# Patient Record
Sex: FEMALE | Race: ASIAN | NOT HISPANIC OR LATINO | URBAN - METROPOLITAN AREA
[De-identification: names, ages, dates, MRNs, and addresses within clinical notes are randomized per-mention and may not be internally consistent; named-entity substitution may affect disease eponyms.]

---

## 2022-02-27 ENCOUNTER — EMERGENCY (EMERGENCY)
Facility: HOSPITAL | Age: 27
LOS: 1 days | Discharge: HOME | End: 2022-02-27
Attending: EMERGENCY MEDICINE | Admitting: EMERGENCY MEDICINE
Payer: MEDICAID

## 2022-02-27 VITALS
OXYGEN SATURATION: 100 % | SYSTOLIC BLOOD PRESSURE: 114 MMHG | DIASTOLIC BLOOD PRESSURE: 74 MMHG | HEART RATE: 75 BPM | RESPIRATION RATE: 18 BRPM | TEMPERATURE: 99 F

## 2022-02-27 VITALS
RESPIRATION RATE: 17 BRPM | HEART RATE: 71 BPM | SYSTOLIC BLOOD PRESSURE: 111 MMHG | TEMPERATURE: 98 F | DIASTOLIC BLOOD PRESSURE: 66 MMHG | OXYGEN SATURATION: 100 %

## 2022-02-27 VITALS
RESPIRATION RATE: 16 BRPM | SYSTOLIC BLOOD PRESSURE: 130 MMHG | OXYGEN SATURATION: 100 % | HEART RATE: 92 BPM | TEMPERATURE: 98 F | DIASTOLIC BLOOD PRESSURE: 89 MMHG

## 2022-02-27 LAB
ALBUMIN SERPL ELPH-MCNC: 4.1 G/DL — SIGNIFICANT CHANGE UP (ref 3.3–5)
ALP SERPL-CCNC: 51 U/L — SIGNIFICANT CHANGE UP (ref 40–120)
ALT FLD-CCNC: 19 U/L — SIGNIFICANT CHANGE UP (ref 4–33)
ANION GAP SERPL CALC-SCNC: 13 MMOL/L — SIGNIFICANT CHANGE UP (ref 7–14)
APPEARANCE UR: ABNORMAL
AST SERPL-CCNC: 16 U/L — SIGNIFICANT CHANGE UP (ref 4–32)
BACTERIA # UR AUTO: ABNORMAL
BASE EXCESS BLDV CALC-SCNC: -0.3 MMOL/L — SIGNIFICANT CHANGE UP (ref -2–3)
BASOPHILS # BLD AUTO: 0.02 K/UL — SIGNIFICANT CHANGE UP (ref 0–0.2)
BASOPHILS NFR BLD AUTO: 0.2 % — SIGNIFICANT CHANGE UP (ref 0–2)
BILIRUB SERPL-MCNC: 0.5 MG/DL — SIGNIFICANT CHANGE UP (ref 0.2–1.2)
BILIRUB UR-MCNC: NEGATIVE — SIGNIFICANT CHANGE UP
BLOOD GAS VENOUS COMPREHENSIVE RESULT: SIGNIFICANT CHANGE UP
BUN SERPL-MCNC: 6 MG/DL — LOW (ref 7–23)
CALCIUM SERPL-MCNC: 9.7 MG/DL — SIGNIFICANT CHANGE UP (ref 8.4–10.5)
CHLORIDE BLDV-SCNC: 101 MMOL/L — SIGNIFICANT CHANGE UP (ref 96–108)
CHLORIDE SERPL-SCNC: 102 MMOL/L — SIGNIFICANT CHANGE UP (ref 98–107)
CO2 BLDV-SCNC: 25.3 MMOL/L — SIGNIFICANT CHANGE UP (ref 22–26)
CO2 SERPL-SCNC: 21 MMOL/L — LOW (ref 22–31)
COLOR SPEC: YELLOW — SIGNIFICANT CHANGE UP
CREAT SERPL-MCNC: 0.55 MG/DL — SIGNIFICANT CHANGE UP (ref 0.5–1.3)
DIFF PNL FLD: NEGATIVE — SIGNIFICANT CHANGE UP
EOSINOPHIL # BLD AUTO: 0.01 K/UL — SIGNIFICANT CHANGE UP (ref 0–0.5)
EOSINOPHIL NFR BLD AUTO: 0.1 % — SIGNIFICANT CHANGE UP (ref 0–6)
EPI CELLS # UR: 7 /HPF — HIGH (ref 0–5)
GAS PNL BLDV: 134 MMOL/L — LOW (ref 136–145)
GLUCOSE BLDV-MCNC: 73 MG/DL — SIGNIFICANT CHANGE UP (ref 70–99)
GLUCOSE SERPL-MCNC: 78 MG/DL — SIGNIFICANT CHANGE UP (ref 70–99)
GLUCOSE UR QL: NEGATIVE — SIGNIFICANT CHANGE UP
HCO3 BLDV-SCNC: 24 MMOL/L — SIGNIFICANT CHANGE UP (ref 22–29)
HCT VFR BLD CALC: 42.8 % — SIGNIFICANT CHANGE UP (ref 34.5–45)
HCT VFR BLDA CALC: 44 % — SIGNIFICANT CHANGE UP (ref 34.5–46.5)
HGB BLD CALC-MCNC: 14.6 G/DL — SIGNIFICANT CHANGE UP (ref 11.5–15.5)
HGB BLD-MCNC: 14.4 G/DL — SIGNIFICANT CHANGE UP (ref 11.5–15.5)
HYALINE CASTS # UR AUTO: 0 /LPF — SIGNIFICANT CHANGE UP (ref 0–7)
IANC: 7.04 K/UL — SIGNIFICANT CHANGE UP (ref 1.5–8.5)
IMM GRANULOCYTES NFR BLD AUTO: 0.3 % — SIGNIFICANT CHANGE UP (ref 0–1.5)
KETONES UR-MCNC: ABNORMAL
LACTATE BLDV-MCNC: 1.6 MMOL/L — SIGNIFICANT CHANGE UP (ref 0.5–2)
LEUKOCYTE ESTERASE UR-ACNC: ABNORMAL
LYMPHOCYTES # BLD AUTO: 1.46 K/UL — SIGNIFICANT CHANGE UP (ref 1–3.3)
LYMPHOCYTES # BLD AUTO: 16.2 % — SIGNIFICANT CHANGE UP (ref 13–44)
MCHC RBC-ENTMCNC: 27.3 PG — SIGNIFICANT CHANGE UP (ref 27–34)
MCHC RBC-ENTMCNC: 33.6 GM/DL — SIGNIFICANT CHANGE UP (ref 32–36)
MCV RBC AUTO: 81.2 FL — SIGNIFICANT CHANGE UP (ref 80–100)
MONOCYTES # BLD AUTO: 0.45 K/UL — SIGNIFICANT CHANGE UP (ref 0–0.9)
MONOCYTES NFR BLD AUTO: 5 % — SIGNIFICANT CHANGE UP (ref 2–14)
NEUTROPHILS # BLD AUTO: 7.04 K/UL — SIGNIFICANT CHANGE UP (ref 1.8–7.4)
NEUTROPHILS NFR BLD AUTO: 78.2 % — HIGH (ref 43–77)
NITRITE UR-MCNC: NEGATIVE — SIGNIFICANT CHANGE UP
NRBC # BLD: 0 /100 WBCS — SIGNIFICANT CHANGE UP
NRBC # FLD: 0 K/UL — SIGNIFICANT CHANGE UP
PCO2 BLDV: 38 MMHG — LOW (ref 39–42)
PH BLDV: 7.41 — SIGNIFICANT CHANGE UP (ref 7.32–7.43)
PH UR: 7.5 — SIGNIFICANT CHANGE UP (ref 5–8)
PLATELET # BLD AUTO: 273 K/UL — SIGNIFICANT CHANGE UP (ref 150–400)
PO2 BLDV: 21 MMHG — SIGNIFICANT CHANGE UP
POTASSIUM BLDV-SCNC: 3.6 MMOL/L — SIGNIFICANT CHANGE UP (ref 3.5–5.1)
POTASSIUM SERPL-MCNC: 3.7 MMOL/L — SIGNIFICANT CHANGE UP (ref 3.5–5.3)
POTASSIUM SERPL-SCNC: 3.7 MMOL/L — SIGNIFICANT CHANGE UP (ref 3.5–5.3)
PROT SERPL-MCNC: 7.1 G/DL — SIGNIFICANT CHANGE UP (ref 6–8.3)
PROT UR-MCNC: ABNORMAL
RBC # BLD: 5.27 M/UL — HIGH (ref 3.8–5.2)
RBC # FLD: 13.2 % — SIGNIFICANT CHANGE UP (ref 10.3–14.5)
RBC CASTS # UR COMP ASSIST: 2 /HPF — SIGNIFICANT CHANGE UP (ref 0–4)
SAO2 % BLDV: 38.8 % — SIGNIFICANT CHANGE UP
SODIUM SERPL-SCNC: 136 MMOL/L — SIGNIFICANT CHANGE UP (ref 135–145)
SP GR SPEC: 1.02 — SIGNIFICANT CHANGE UP (ref 1–1.05)
UROBILINOGEN FLD QL: SIGNIFICANT CHANGE UP
WBC # BLD: 9.01 K/UL — SIGNIFICANT CHANGE UP (ref 3.8–10.5)
WBC # FLD AUTO: 9.01 K/UL — SIGNIFICANT CHANGE UP (ref 3.8–10.5)
WBC UR QL: 7 /HPF — HIGH (ref 0–5)

## 2022-02-27 PROCEDURE — 99284 EMERGENCY DEPT VISIT MOD MDM: CPT

## 2022-02-27 RX ORDER — ONDANSETRON 8 MG/1
1 TABLET, FILM COATED ORAL
Qty: 6 | Refills: 0
Start: 2022-02-27 | End: 2022-02-28

## 2022-02-27 RX ORDER — SODIUM CHLORIDE 9 MG/ML
1000 INJECTION INTRAMUSCULAR; INTRAVENOUS; SUBCUTANEOUS ONCE
Refills: 0 | Status: COMPLETED | OUTPATIENT
Start: 2022-02-27 | End: 2022-02-27

## 2022-02-27 RX ORDER — ONDANSETRON 8 MG/1
4 TABLET, FILM COATED ORAL ONCE
Refills: 0 | Status: COMPLETED | OUTPATIENT
Start: 2022-02-27 | End: 2022-02-27

## 2022-02-27 RX ADMIN — SODIUM CHLORIDE 1000 MILLILITER(S): 9 INJECTION INTRAMUSCULAR; INTRAVENOUS; SUBCUTANEOUS at 19:41

## 2022-02-27 RX ADMIN — ONDANSETRON 4 MILLIGRAM(S): 8 TABLET, FILM COATED ORAL at 19:41

## 2022-02-27 RX ADMIN — Medication 30 MILLILITER(S): at 21:43

## 2022-02-27 NOTE — ED PROVIDER NOTE - OBJECTIVE STATEMENT
25 y/o female presenting to ER c/o weakness w/ nausea/vomiting. Pt. ~6 wks pregnant took mifrepistone 200mg  yesterday for elective termination of pregnancy - states this morning developed nausea and vomiting with weakness. Admits to some mild abdominal cramping but otherwise no pain. Pt. denies vaginal bleeding urinary frequency urgnecy fevers or chills.

## 2022-02-27 NOTE — ED ADULT TRIAGE NOTE - CHIEF COMPLAINT QUOTE
pt 6 weeks pregnant c/o n/v and generalized weakness starting this AM. Pt states she was prescribed Mifepristone for pregnancy termination- took first dose yesterday. No PMH

## 2022-02-27 NOTE — ED PROVIDER NOTE - ATTENDING CONTRIBUTION TO CARE
Attending note:   After face to face evaluation of this patient, I concur with above noted hx, pe, and care plan for this patient.  Rodriguez: 26 yof appx 6 weeks IUP taking Mifepristone yesterday at 5pm and today with nausea and vomiting, no abd pain or bleeding or diarrhea. Pt did not take second dose yet. Pt told is unsuccessful would need D&C. Pt is in no distress, clear lungs, RRR, abd soft with no tn, no edema, no CVAT.   Pt with likely side effects from medication with unremarkable exam - provide sx relief and fluids and check electrolytes. if pt able to tolerate, take second dose.

## 2022-02-27 NOTE — ED PROVIDER NOTE - PROGRESS NOTE DETAILS
SESAR Cronin - patient feeling better after meds/iv fluids, labs wnl - po challened without diffculty. Stable for DC>

## 2022-02-27 NOTE — ED ADULT NURSE NOTE - WILL THE PATIENT ACCEPT THE PFIZER COVID-19 VACCINE IF ELIGIBLE AND IT IS AVAILABLE?
ASPIRIN and NSAID PRODUCTS    The following is a list of medications that either contain aspirin or nonsteroidal anti-inflammatory agents (NSAID's). Please do not take these medications.    OVER-THE-COUNTER:  Do not take five (5) days prior to procedure.  Do not take for two (2) weeks after procedure.  · Aspirin (generic):  Brand Names:  Anacin, Phuong, [place], Aspergum, Aspercin, Aspermin, Aspertab, Back-quell, Duradyne, Empirin, Gemnisyn, Genprin, Gensan, Magnaprin, McNess Pain Tab, Momentum, P-A-C, Pain Reliever Tabs, Tri-Pain Caplets, Vanquish Caplet.  · Buffered Aspirin (generic):  Brand Names:  Ascriptin, Bufferin, Ecotrin, Buffaprin, Buffasal, Buffinol, COPE.  · BC Powders/Tablets  · Goody's Powders  · Stanback Powders or Tablets  · Excedrin, Excedrin Extra, Excedrin IB  · Mel [place] or Bromo-[place]  · Ibuprofen (generic):  Brand Names:  Advil, Nuprin, Motrin IB, Adapin, Genpril, Ibufen 200, Menadol, Midol IB, Dristan Sinus, Ursinus [place] Tabs, Dimetapp Sinus, Valparin, Haltran Tabs, Hu's Pills, Ajay.  · Aspirin Suppositories (generic, any strength)  · Naproxen (generic)  Brand Name: Aleve  · Pepto Bismol    PRESCRIPTION:  Brand Name Generic Name    Fiorinal  butalbital, aspirin, caffeine    Naprosyn, Anaprox  naproxen    Voltaren, Cataflam  diclofenac    Feldene  piroxicam    Motrin (Rx), Rufen  ibuprofen    Ansaid  flurbiprofen    Orudis  ketoprofen    Dolobid  diflunisal    Clinoril  sulindac    Indocin  indomethacin    Tolectin  tolmetin    Azdone Tabs  aspirin plus hydrocodone    Percodan  aspirin plus oxycodone    Synalgos  aspirin plus dihydrocodeine    Daypro  oxaprozin    Lodine  etodolac    Meclomen  meclofenamate    Nalfon  fenoprofen    Ponstel (mefenamic acid)     Relafen  nabumetone    Toradol  ketorolac     If you have a headache, backache, arthritis or other painful condition, please take Tylenol, Datril or some other non aspirin-containing product.            Interactions with Herbs  and Dietary Supplements      Ginkgo biloba    Garlic    Saw palmetto    Alfalfa    American ginseng    Gisele    Anise    Arnica mont[place]    Asafetida    [place] bark    Bilberry    Birch    Black cohosh    Bladderwrack    Bogbean    Boldo    Borage seed oil    Bromelain    Capsicum    Cat's claw    Celery     Chamomile    [place]    Clove    Coleus    Cordyceps       Dandelion     Danshen    Devil's claw    Dong quai    EPA (eicosapentaenoic    acid, found in fish oils)    Evening primrose oil    Fenugreek    Feverfew    Fish oil    Flaxseed/flax powder (not a    concern with flaxseed oil)    Tracie    Grapefruit juice    Grapeseed    Green tea    Guggul    Gymnestra    Horse chestnut    Horseradish    Licorie root    Lovage root    Male fern    Ashish    Melatonin    Nordihydroguairetic acid    (NDGA)   Omega-3 fatty acids    Onion    Papain    Panax ginseng    Parsley    Passionflower    Poplar    Prickly huan    Propolis    Quassia    Red clover    Reishi    Siberian ginseng    Sweet clover    Rue    Sweet birch    Turmeric    Vitamin E    White willow    Wild carrot    Wild lettuce    Unity    [place]    Maple           Not applicable

## 2022-02-27 NOTE — ED PROVIDER NOTE - PATIENT PORTAL LINK FT
You can access the FollowMyHealth Patient Portal offered by [facility] by registering at the following website: [email or web address]. By joining [employer]’s FollowMyHealth portal, you will also be able to view your health information using other applications (apps) compatible with our system.

## 2022-02-27 NOTE — ED PROVIDER NOTE - NSFOLLOWUPINSTRUCTIONS_ED_ALL_ED_FT
Acute Nausea and Vomiting  WHAT YOU NEED TO KNOW:  Acute nausea and vomiting start suddenly, worsen quickly, and last a short time.  DISCHARGE INSTRUCTIONS:  Return to the emergency department if:  You see blood in your vomit or your bowel movements.  You have sudden, severe pain in your chest and upper abdomen after hard vomiting or retching.  You have swelling in your neck and chest.  You are dizzy, cold, and thirsty and your eyes and mouth are dry.  You are urinating very little or not at all.  You have muscle weakness, leg cramps, and trouble breathing.  Your heart is beating much faster than normal.  You continue to vomit for more than 48 hours.  Contact your healthcare provider if:  You have frequent dry heaves (vomiting but nothing comes out).  Your nausea and vomiting does not get better or go away after you use medicine.  You have questions or concerns about your condition or treatment.  Medicines: You may need any of the following:  Medicines may be given to calm your stomach and stop your vomiting. You may also need medicines to help you feel more relaxed or to stop nausea and vomiting caused by motion sickness.  Gastrointestinal stimulants are used to help empty your stomach and bowels. This may help decrease nausea and vomiting.  Take your medicine as directed. Contact your healthcare provider if you think your medicine is not helping or if you have side effects. Tell him or her if you are allergic to any medicine. Keep a list of the medicines, vitamins, and herbs you take. Include the amounts, and when and why you take them. Bring the list or the pill bottles to follow-up visits. Carry your medicine list with you in case of an emergency.  Prevent or manage acute nausea and vomiting:  Do not drink alcohol. Alcohol may upset or irritate your stomach. Too much alcohol can also cause acute nausea and vomiting.  Control stress. Headaches due to stress may cause nausea and vomiting. Find ways to relax and manage your stress. Get more rest and sleep.  Drink more liquids as directed. Vomiting can lead to dehydration. It is important to drink more liquids to help replace lost body fluids. Ask your healthcare provider how much liquid to drink each day and which liquids are best for you. Your provider may recommend that you drink an oral rehydration solution (ORS). ORS contains water, salts, and sugar that are needed to replace the lost body fluids. Ask what kind of ORS to use, how much to drink, and where to get it.  Eat smaller meals, more often. Eat small amounts of food every 2 to 3 hours, even if you are not hungry. Food in your stomach may decrease your nausea.  Talk to your healthcare provider before you take over-the-counter (OTC) medicines. These medicines can cause serious problems if you use certain other medicines, or you have a medical condition. You may have problems if you use too much or use them for longer than the label says. Follow directions on the label carefully.  Follow up with your healthcare provider as directed: Write down your questions so you remember to ask them during your follow-up visits.  Náuseas y vómitos agudos    LO QUE NECESITA SABER:  Las náuseas y los vómitos agudos comienzan de forma repentina, empeoran rápidamente y araya un período breve de tiempo.  INSTRUCCIONES SOBRE EL RICCARDO HOSPITALARIA:  Busque atención médica de inmediato si:  Usted nota elle en byrne vómito o en sara evacuaciones.  Usted siente un dolor súbito e intenso en el pecho y la parte superior de byrne abdomen después de tener vómitos yecenia o tratar de vomitar.  Usted tiene el shant y el pecho inflamados.  Usted está mareado, tiene frío, sed y sequedad en los ojos y la boca.  Usted está orinando muy poco o nada en absoluto.  Usted tiene debilidad muscular, calambres en las piernas y dificultad para respirar.  Byrne corazón late más rápido que de costumbre.  Usted continúa vomitando por más de 48 horas.  Comuníquese con byrne médico si:  Usted tiene arcadas secas (vómitos sin que salga nada) frecuentes.  Sara náusea y vómitos no mejoran ni desaparecen después de usar el medicamento.  Usted tiene preguntas o inquietudes acerca de byrne condición o tratamiento.  Medicamentos:Es posible que usted necesite alguno de los siguientes:  Los medicamentosse puede administrar para calmarle el estómago y detener sara vómitos. Usted también puede necesitar medicamentos para ayudarlo a sentirse más relajado o para detener las náuseas y los vómitos causados por el mareo por movimiento.  Se usan los estimulantes gastrointestinalespara ayudar a vaciar byrne estómago y los intestinos. [place] puede ayudar para reducir las náuseas y el vómito.  [place] sara medicamentos everton se le haya indicado.Consulte con byrne médico si usted smiley que byrne medicamento no le está ayudando o si presenta efectos secundarios. Infórmele si es alérgico a cualquier medicamento. Mantenga horacio lista actualizada de los medicamentos, las vitaminas y los productos herbales que jf. Incluya los siguientes datos de los medicamentos: cantidad, frecuencia y motivo de administración. Traiga con usted la lista o los envases de las píldoras a sara citas de seguimiento. Lleve la lista de los medicamentos con usted en marlen de horacio emergencia.  Evite o controle las náuseas y los vómitos agudos:  No consuma alcohol.El alcohol podría causarle malestar o irritación estomacal. Horacio cantidad elevada de alcohol también puede causar náuseas y vómitos agudos.  Controle el estrés.Los rene de farzaneh que son el resultado de tensión nerviosa pueden causar náuseas y vómitos. Busque la manera de relajarse y controlar el estrés. Descanse y duerma más.  [place] más líquidos everton se le indique.Los vómitos pueden llevar a la deshidratación. Es importante beber más líquidos para ayudar a reemplazar los fluidos corporales perdidos. Pregunte a byrne médico sobre la cantidad de líquido que necesita khris todos los ayad y cuáles le recomienda. Byrne médico podría recomendarle que tome horacio solución de rehidratación oral (SRO). Las soluciones de rehidratación oral contienen la cantidad adecuada de agua, sales y azúcar que necesita para restituir los líquidos que perdió byrne organismo. Pregunte qué tipo de solución de rehidratación oral debe usar, qué cantidad debe khris y dónde puede obtenerla.  Ingiera comidas más pequeñas, más a menudo.Coma pequeñas cantidades de comida cada 2 o 3 horas, incluso si no tiene hambre. Sara náuseas podrían disminuir si tiene comida en el estómago.  Hable con byrne médico antes de khris medicamentos de venta sarah.Estos medicamentos pueden causar problemas serios si los usa junto con ciertos medicamentos o si tiene determinadas condiciones médicas. Podrían tener problemas si usa horacio dosis demasiado riccardo o los usa odell más tiempo de lo indicado. Siga las indicaciones de la etiqueta al pie de la letra.  Acuda a sara consultas de control con byrne médico según le indicaron.Anote sara preguntas para que se acuerde de hacerlas odell sara visitas.

## 2022-02-27 NOTE — ED ADULT TRIAGE NOTE - CHIEF COMPLAINT QUOTE
Nausea and vomiting and stomach pain x 2 hours. PT recently D/C'd from ED for same symptoms today. Pt is 6 weeks pregnant. No PMH

## 2022-02-27 NOTE — ED PROVIDER NOTE - CLINICAL SUMMARY MEDICAL DECISION MAKING FREE TEXT BOX
27 y/o female c/o nausea vomit and weakness x 1 day after taking first does of mifrepistone  -likely secondary to medication  -cbc cmp ua  -iv fluids zofran  -reassess

## 2022-02-27 NOTE — ED ADULT NURSE NOTE - OBJECTIVE STATEMENT
Patient is a 27 yo female, denies PMH, presenting with nausea, vomiting, weakness since this morning. Patient ~6 weeks pregnant and took mifrepistone 200 mg yesterday for elective termination of pregnancy. Patient with mild abdominal cramping, denies fever, chills, diarrhea, urinary symptoms. AAOx4, no signs of distress. 20G PIV placed to [facility], labs sent per orders. Medicated for nausea. Patient educated on fall precautions.

## 2022-02-28 VITALS
HEART RATE: 80 BPM | OXYGEN SATURATION: 100 % | SYSTOLIC BLOOD PRESSURE: 96 MMHG | TEMPERATURE: 98 F | RESPIRATION RATE: 16 BRPM | DIASTOLIC BLOOD PRESSURE: 59 MMHG

## 2022-02-28 RX ORDER — KETOROLAC TROMETHAMINE 30 MG/ML
15 SYRINGE (ML) INJECTION ONCE
Refills: 0 | Status: DISCONTINUED | OUTPATIENT
Start: 2022-02-28 | End: 2022-02-28

## 2022-02-28 RX ORDER — MORPHINE SULFATE 50 MG/1
4 CAPSULE, EXTENDED RELEASE ORAL ONCE
Refills: 0 | Status: DISCONTINUED | OUTPATIENT
Start: 2022-02-28 | End: 2022-02-28

## 2022-02-28 RX ORDER — SODIUM CHLORIDE 9 MG/ML
1000 INJECTION, SOLUTION INTRAVENOUS ONCE
Refills: 0 | Status: COMPLETED | OUTPATIENT
Start: 2022-02-28 | End: 2022-02-28

## 2022-02-28 RX ORDER — ACETAMINOPHEN 500 MG
1000 TABLET ORAL ONCE
Refills: 0 | Status: COMPLETED | OUTPATIENT
Start: 2022-02-28 | End: 2022-02-28

## 2022-02-28 RX ORDER — ONDANSETRON 8 MG/1
4 TABLET, FILM COATED ORAL ONCE
Refills: 0 | Status: COMPLETED | OUTPATIENT
Start: 2022-02-28 | End: 2022-02-28

## 2022-02-28 RX ADMIN — Medication 15 MILLIGRAM(S): at 01:24

## 2022-02-28 RX ADMIN — MORPHINE SULFATE 4 MILLIGRAM(S): 50 CAPSULE, EXTENDED RELEASE ORAL at 07:05

## 2022-02-28 RX ADMIN — SODIUM CHLORIDE 1000 MILLILITER(S): 9 INJECTION, SOLUTION INTRAVENOUS at 01:55

## 2022-02-28 RX ADMIN — MORPHINE SULFATE 4 MILLIGRAM(S): 50 CAPSULE, EXTENDED RELEASE ORAL at 04:41

## 2022-02-28 RX ADMIN — Medication 15 MILLIGRAM(S): at 03:17

## 2022-02-28 RX ADMIN — Medication 400 MILLIGRAM(S): at 01:25

## 2022-02-28 RX ADMIN — Medication 1000 MILLIGRAM(S): at 03:17

## 2022-02-28 RX ADMIN — Medication 15 MILLIGRAM(S): at 04:41

## 2022-02-28 RX ADMIN — Medication 15 MILLIGRAM(S): at 07:05

## 2022-02-28 RX ADMIN — ONDANSETRON 4 MILLIGRAM(S): 8 TABLET, FILM COATED ORAL at 01:26

## 2022-02-28 NOTE — ED PROVIDER NOTE - CLINICAL SUMMARY MEDICAL DECISION MAKING FREE TEXT BOX
26yF with vomiting and abdominal pain after medication pregnancy termination. Vital signs show normal BP and HR, Temp 99.3 low grade. Exam shows some LLQ RLA suprapubic tenderness with uterine without adnexal tenderness on bimanual exam. Will not repeat labs drawn a few hours ago. Low suspicion for acute intra-abdominal process other than cramping from misoprostol based on exam and timing of symptoms, will not get transvaginal US. WIll give Zofran, tylenol IV, ketorolac, 1L LR.

## 2022-02-28 NOTE — ED PROVIDER NOTE - OBJECTIVE STATEMENT
26yF no PMHx presents with nausea/vomiting and 3 hours of abdominal pain. pt is in the process of undergoing a medication pregnancy termination, received her initial medication mifepristone 2 days ago, after which she had nausea and vomiting and presented to the ER yesterday. She was given Zofran with improvement of symptoms, however vomited twice after discharge because patient was not able to  pharmacy anti-nausea medication due to late hour. She took her 2nd pill Misoprostol and a few minutes later began to have severe abdominal cramping uncontrolled with Tylenol. Pt returns to the ED for uncontrollable pain and nausea. Pt denies diarrhea, blood in the stool, blood or coffee grounds in the vomit, fever, vaginal discharge other than blood, urinary symptoms, chest pain or shortness of breath.

## 2022-02-28 NOTE — ED PROVIDER NOTE - ATTENDING CONTRIBUTION TO CARE
27yo F with no PMHX  6wks pregnant, presenting to our ED for 2nd time in last 24hrs for lower abd pain and n/v after taking mifepristone for an elective  (provided by an outside private OBGYN).  Pt took 1st dose of mifepristone prior to first ED visit, and was feeling better after receiving IV zofran, then after discharge from ED she took 2nd dose of mifepristone then pain and n/v became worse and vaginal bleeding started saturating 1 pad so returned to ED.  No fevers, dysuria, vaginal discharge.    General: Patient alert in no apparent distress  Skin: Dry and intact  HEENT: Head atraumatic. Oral mucosa moist.   Eyes: Conjunctiva normal  Cardiac: Regular rhythm and rate. No pretibial edema b/l  Respiratory: Lungs clear b/l and symmetric. No respiratory distress. Able to speak in complete sentences.  Gastrointestinal: Abdomen soft, nondistended, mild suprapubic tenderness  Pelvic exam per resident note   Musculoskeletal: Moves all extremities spontaneously  Neurological: alert and oriented to person, place, and time  Psychiatric: Calm and cooperative     a/p  symptoms likely from mifepristone use  not with symptomatic anemia and coloring good  low suspicion ovarian pathology as pain not lateralizing  will treat symptomatically- IVF, zofran, toradol

## 2022-02-28 NOTE — ED ADULT NURSE NOTE - OBJECTIVE STATEMENT
Pt. was discharged an hour ago from the ED. States the pain is from the  pill. c/o abdominal pain. MD evaluation in progress. NAD noted.

## 2022-02-28 NOTE — ED PROVIDER NOTE - PATIENT PORTAL LINK FT
You can access the FollowMyHealth Patient Portal offered by [facility] by registering at the following website: [email or web address]. By joining [employer]’s FollowMyHealth portal, you will also be able to view your health information using other applications (apps) compatible with our system.

## 2022-02-28 NOTE — ED PROVIDER NOTE - NSFOLLOWUPINSTRUCTIONS_ED_ALL_ED_FT
You can expect to have bleeding with large clots, and abdominal cramping for another couple of days. If you are still having pain and bleeding after 5 total days please return to your OB/Gyn provider for assessment. You can use Tylenol and Ibuprofen (Motrin/advil/naproxen) to help control pain. you can use both at the same time every 6 hours or alternate taking tylenol then Ibuprofen every 3 hours. If you begin to feel lightheaded, have shortness of breath with walking, develop significant fatigue, or have any other new or concerning symptoms please return to the Emergency Department. Please take the anti-nausea medication prescribed to you to help with the vomiting. You can expect to have bleeding with large clots, and abdominal cramping for another couple of days. If you are still having pain and bleeding after 5 total days please return to your OB/Gyn provider for assessment. You can use Tylenol and Ibuprofen (Motrin/advil/naproxen) to help control pain. you can use both at the same time every 6 hours or alternate taking tylenol then Ibuprofen every 3 hours. May take pepcid 20mg twice a day before meals for acid reflux. If you begin to feel lightheaded, have shortness of breath with walking, develop significant fatigue, or have any other new or concerning symptoms please return to the Emergency Department. Please take the anti-nausea medication prescribed to you to help with the vomiting.

## 2022-02-28 NOTE — ED PROVIDER NOTE - CHIEF COMPLAINT
The patient is a 26y Female complaining of nausea. The patient is a 26y Female complaining of nausea and abd pain.

## 2022-02-28 NOTE — ED PROVIDER NOTE - PHYSICAL EXAMINATION
GENERAL: Sitting uncomfortably in bed in acute distress from pain  NEURO: Alert and Oriented to person, place, date and situation. Pupils symmetric, No ptosis. No facial asymmetry or dysarthria, no tremor noted.  HEENT: No conjunctival injection or scleral icterus.   CARD: Normal rate and regular rhythm, no murmurs and no gallops appreciated.  RESP: Clear to auscultation bilaterally, No wheezes, rales or rhonchi. Good respiratory effort.  ABD: Bowel sounds active. Nondistended, tender to palpation in RLQ, LLA, suprapubic area, some guarding, no rigidity. No masses appreciated.  EXT: No pedal edema. 2+DP pulses bilaterally.  GENITAL: external genital exam shows no bleeding lesions o rashes in the vulva or on the anus. Dark red blood coming from vaginal introitus. Bimanual exam performed which shows some uterine tenderness without kaylee adnexal tenderness. No enlarged ovaries felt.  SKIN: No rashes, bruising or acute skin injuries on face, limbs, abdomen, chest or back

## 2022-02-28 NOTE — ED PROVIDER NOTE - PROGRESS NOTE DETAILS
Dr. Hai Hernandez DO (ED ATTENDING):  patient with improved pain but continues to have pain and requesting more IV pain meds. Pt is able to tolerate po intake sxs improving, pt comfortable with dc home at this time with OBGYN f/u

## 2022-02-28 NOTE — ED PROVIDER NOTE - NS ED ROS FT
CONSTITUTIONAL - No fever, No weight change, No lightheadedness  SKIN - No rash  HEMATOLOGIC - No abnormal bleeding or bruising  EYES - No eye pain, No blurred vision  ENT - No change in hearing, No sore throat, No neck pain, No rhinorrhea, No ear pain  RESPIRATORY - No shortness of breath, No cough  CARDIAC -No chest pain, No palpitations  GI - (+)abdominal pain, (+) vomiting, No diarrhea, No constipation  - No dysuria, no frequency, no hematuria.   MUSCULOSKELETAL - No joint pain, No swelling, No back pain  NEUROLOGIC - No numbness, No focal weakness, No headache, No dizziness

## 2022-10-13 PROBLEM — Z78.9 OTHER SPECIFIED HEALTH STATUS: Chronic | Status: ACTIVE | Noted: 2022-02-27

## 2022-11-15 PROBLEM — Z00.00 ENCOUNTER FOR PREVENTIVE HEALTH EXAMINATION: Status: ACTIVE | Noted: 2022-11-15

## 2022-11-16 ENCOUNTER — ASOB RESULT (OUTPATIENT)
Age: 27
End: 2022-11-16

## 2022-11-16 ENCOUNTER — LABORATORY RESULT (OUTPATIENT)
Age: 27
End: 2022-11-16

## 2022-11-16 ENCOUNTER — APPOINTMENT (OUTPATIENT)
Dept: ANTEPARTUM | Facility: CLINIC | Age: 27
End: 2022-11-16

## 2022-11-16 PROCEDURE — 76813 OB US NUCHAL MEAS 1 GEST: CPT

## 2022-11-16 PROCEDURE — 76801 OB US < 14 WKS SINGLE FETUS: CPT | Mod: 59

## 2022-12-22 ENCOUNTER — APPOINTMENT (OUTPATIENT)
Dept: ANTEPARTUM | Facility: CLINIC | Age: 27
End: 2022-12-22
Payer: MEDICAID

## 2022-12-22 ENCOUNTER — ASOB RESULT (OUTPATIENT)
Age: 27
End: 2022-12-22

## 2022-12-22 PROCEDURE — 99212 OFFICE O/P EST SF 10 MIN: CPT | Mod: TH,25

## 2022-12-22 PROCEDURE — 76817 TRANSVAGINAL US OBSTETRIC: CPT | Mod: 59

## 2022-12-22 PROCEDURE — 76805 OB US >/= 14 WKS SNGL FETUS: CPT

## 2023-03-24 ENCOUNTER — EMERGENCY (EMERGENCY)
Facility: HOSPITAL | Age: 28
LOS: 1 days | Discharge: HOME | End: 2023-03-24
Attending: STUDENT IN AN ORGANIZED HEALTH CARE EDUCATION/TRAINING PROGRAM | Admitting: STUDENT IN AN ORGANIZED HEALTH CARE EDUCATION/TRAINING PROGRAM
Payer: MEDICAID

## 2023-03-24 VITALS
RESPIRATION RATE: 16 BRPM | OXYGEN SATURATION: 100 % | HEART RATE: 94 BPM | TEMPERATURE: 98 F | DIASTOLIC BLOOD PRESSURE: 70 MMHG | SYSTOLIC BLOOD PRESSURE: 113 MMHG

## 2023-03-24 LAB
ALBUMIN SERPL ELPH-MCNC: 3.8 G/DL — SIGNIFICANT CHANGE UP (ref 3.3–5)
ALP SERPL-CCNC: 145 U/L — HIGH (ref 40–120)
ALT FLD-CCNC: 11 U/L — SIGNIFICANT CHANGE UP (ref 4–33)
ANION GAP SERPL CALC-SCNC: 13 MMOL/L — SIGNIFICANT CHANGE UP (ref 7–14)
APPEARANCE UR: ABNORMAL
AST SERPL-CCNC: 16 U/L — SIGNIFICANT CHANGE UP (ref 4–32)
BASOPHILS # BLD AUTO: 0.02 K/UL — SIGNIFICANT CHANGE UP (ref 0–0.2)
BASOPHILS NFR BLD AUTO: 0.2 % — SIGNIFICANT CHANGE UP (ref 0–2)
BILIRUB SERPL-MCNC: 0.3 MG/DL — SIGNIFICANT CHANGE UP (ref 0.2–1.2)
BILIRUB UR-MCNC: NEGATIVE — SIGNIFICANT CHANGE UP
BUN SERPL-MCNC: 7 MG/DL — SIGNIFICANT CHANGE UP (ref 7–23)
CALCIUM SERPL-MCNC: 9.3 MG/DL — SIGNIFICANT CHANGE UP (ref 8.4–10.5)
CHLORIDE SERPL-SCNC: 104 MMOL/L — SIGNIFICANT CHANGE UP (ref 98–107)
CO2 SERPL-SCNC: 21 MMOL/L — LOW (ref 22–31)
COLOR SPEC: SIGNIFICANT CHANGE UP
CREAT SERPL-MCNC: 0.46 MG/DL — LOW (ref 0.5–1.3)
DIFF PNL FLD: NEGATIVE — SIGNIFICANT CHANGE UP
EGFR: 134 ML/MIN/1.73M2 — SIGNIFICANT CHANGE UP
EOSINOPHIL # BLD AUTO: 0.07 K/UL — SIGNIFICANT CHANGE UP (ref 0–0.5)
EOSINOPHIL NFR BLD AUTO: 0.8 % — SIGNIFICANT CHANGE UP (ref 0–6)
GLUCOSE SERPL-MCNC: 112 MG/DL — HIGH (ref 70–99)
GLUCOSE UR QL: NEGATIVE — SIGNIFICANT CHANGE UP
HCT VFR BLD CALC: 32.2 % — LOW (ref 34.5–45)
HGB BLD-MCNC: 10.3 G/DL — LOW (ref 11.5–15.5)
IANC: 5.63 K/UL — SIGNIFICANT CHANGE UP (ref 1.8–7.4)
IMM GRANULOCYTES NFR BLD AUTO: 0.5 % — SIGNIFICANT CHANGE UP (ref 0–0.9)
KETONES UR-MCNC: ABNORMAL
LEUKOCYTE ESTERASE UR-ACNC: ABNORMAL
LYMPHOCYTES # BLD AUTO: 1.89 K/UL — SIGNIFICANT CHANGE UP (ref 1–3.3)
LYMPHOCYTES # BLD AUTO: 22.9 % — SIGNIFICANT CHANGE UP (ref 13–44)
MAGNESIUM SERPL-MCNC: 1.7 MG/DL — SIGNIFICANT CHANGE UP (ref 1.6–2.6)
MCHC RBC-ENTMCNC: 25.4 PG — LOW (ref 27–34)
MCHC RBC-ENTMCNC: 32 GM/DL — SIGNIFICANT CHANGE UP (ref 32–36)
MCV RBC AUTO: 79.3 FL — LOW (ref 80–100)
MONOCYTES # BLD AUTO: 0.59 K/UL — SIGNIFICANT CHANGE UP (ref 0–0.9)
MONOCYTES NFR BLD AUTO: 7.2 % — SIGNIFICANT CHANGE UP (ref 2–14)
NEUTROPHILS # BLD AUTO: 5.63 K/UL — SIGNIFICANT CHANGE UP (ref 1.8–7.4)
NEUTROPHILS NFR BLD AUTO: 68.4 % — SIGNIFICANT CHANGE UP (ref 43–77)
NITRITE UR-MCNC: NEGATIVE — SIGNIFICANT CHANGE UP
NRBC # BLD: 0 /100 WBCS — SIGNIFICANT CHANGE UP (ref 0–0)
NRBC # FLD: 0 K/UL — SIGNIFICANT CHANGE UP (ref 0–0)
PH UR: 6 — SIGNIFICANT CHANGE UP (ref 5–8)
PHOSPHATE SERPL-MCNC: 3.3 MG/DL — SIGNIFICANT CHANGE UP (ref 2.5–4.5)
PLATELET # BLD AUTO: 315 K/UL — SIGNIFICANT CHANGE UP (ref 150–400)
POTASSIUM SERPL-MCNC: 3.8 MMOL/L — SIGNIFICANT CHANGE UP (ref 3.5–5.3)
POTASSIUM SERPL-SCNC: 3.8 MMOL/L — SIGNIFICANT CHANGE UP (ref 3.5–5.3)
PROT SERPL-MCNC: 6.5 G/DL — SIGNIFICANT CHANGE UP (ref 6–8.3)
PROT UR-MCNC: ABNORMAL
RBC # BLD: 4.06 M/UL — SIGNIFICANT CHANGE UP (ref 3.8–5.2)
RBC # FLD: 12.8 % — SIGNIFICANT CHANGE UP (ref 10.3–14.5)
SODIUM SERPL-SCNC: 138 MMOL/L — SIGNIFICANT CHANGE UP (ref 135–145)
SP GR SPEC: 1.02 — SIGNIFICANT CHANGE UP (ref 1.01–1.05)
UROBILINOGEN FLD QL: SIGNIFICANT CHANGE UP
WBC # BLD: 8.24 K/UL — SIGNIFICANT CHANGE UP (ref 3.8–10.5)
WBC # FLD AUTO: 8.24 K/UL — SIGNIFICANT CHANGE UP (ref 3.8–10.5)

## 2023-03-24 PROCEDURE — 99285 EMERGENCY DEPT VISIT HI MDM: CPT

## 2023-03-24 RX ORDER — NITROFURANTOIN MACROCRYSTAL 50 MG
100 CAPSULE ORAL ONCE
Refills: 0 | Status: COMPLETED | OUTPATIENT
Start: 2023-03-24 | End: 2023-03-24

## 2023-03-24 RX ORDER — SODIUM CHLORIDE 9 MG/ML
1000 INJECTION INTRAMUSCULAR; INTRAVENOUS; SUBCUTANEOUS ONCE
Refills: 0 | Status: COMPLETED | OUTPATIENT
Start: 2023-03-24 | End: 2023-03-24

## 2023-03-24 RX ORDER — NITROFURANTOIN MACROCRYSTAL 50 MG
1 CAPSULE ORAL
Qty: 14 | Refills: 0
Start: 2023-03-24 | End: 2023-03-30

## 2023-03-24 RX ADMIN — Medication 100 MILLIGRAM(S): at 20:56

## 2023-03-24 RX ADMIN — SODIUM CHLORIDE 1000 MILLILITER(S): 9 INJECTION INTRAMUSCULAR; INTRAVENOUS; SUBCUTANEOUS at 18:29

## 2023-03-24 NOTE — CONSULT NOTE ADULT - ASSESSMENT
27y  at 32w3d (CARMEN: 23) presents with fatigue and generalized weakness x2 days.    - CBC/ CMP reviewed. No abnormalities noted.   - Discussed sleep hygiene and the use of PRN OTC Unisom or Benadryl for sleep   - Pt found to have asymptomatic +UA. Please send urine culture. Will treat empirically with Macrobid  - NST prior to discharge.     DW: Dr. Kaye Garcia, PGY-2

## 2023-03-24 NOTE — ED PROVIDER NOTE - PATIENT PORTAL LINK FT
You can access the FollowMyHealth Patient Portal offered by [facility] by registering at the following website: [email or web address]. By joining [employer]’s FollowMyHealth portal, you will also be able to view your health information using other applications (apps) compatible with our system.

## 2023-03-24 NOTE — ED PROVIDER NOTE - CPE EDP NEURO NORM
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  Informed consent was obtained. Following the administration of propofol as sedation, patient was placed in the  left lateral decubitus position and prepped and draped in a sterile manner.  Approximately 2 cc of 1% Lidocaine was used over the right posterior iliac spine.  Following this a 3 mm incision was made. Trephine bone marrow core measuring 15 mm per special heme tech was obtained from the [facility]. Bone marrow aspirates were obtained from the [facility]. Aspirates were sent for morphology, immunophenotyping, cytogenetics and molecular diagnostics RFLP.  A total of approximately 20 ml of marrow was aspirated.  Following this procedure a sterile dressing was applied to the bone marrow biopsy site. The patient was placed in the supine position to maintain pressure on the biopsy site. Post-procedure wound care instructions were given. The patient tolerated the procedure well with no apparent discomfort.  Complications: None    Procedure performed by:   Shannon J. Schroetter, CPNP-KARRIE  Pediatric Blood and Marrow Transplant Program  [facility]  Pager: [phone]  [facility] Phone: [phone]  Inpatient work room: [phone]       normal...

## 2023-03-24 NOTE — ED PROVIDER NOTE - ATTENDING APP SHARED VISIT CONTRIBUTION OF CARE
I (Jayden) agree with above, I performed a history and physical. Counseled anabel medical staff, physician assistant, and/or medical student on medical decision making as documented. Medical decisions and treatment interventions were made in real time during the patient encounter. Additionally and/or with the following exceptions: Patient is a 27-year-old female 32 weeks pregnant by ultrasound G1, P0 who is presenting to the emergency department generalized weakness.  Yesterday had 1 episode of abdominal pain.  Patient is well-appearing, vital signs are normal.  Has a gravid uterus.  Mucous membranes moist.  Neurologically intact moving all extremities.  CBC with hemoglobin of 10.3 no evidence of leukocytosis.,  CMP nonactionable.  Urinalysis positive for possible UTI.  Patient has no symptoms.  We will treat.  OB consulted for NST which was normal.  Follow-up her OB as necessary return precautions reviewed

## 2023-03-24 NOTE — ED ADULT TRIAGE NOTE - CHIEF COMPLAINT QUOTE
Pt 32 weeks pregnant c/o generalized weakness x 2 days. Pt endorsing nausea without vomiting, some abdominal pain last night. Denies abdominal pain currently, vaginal bleeding, fevers. Reports +fetal movement. Due date 5/16. As per L&D, Pt to be seen in ED.

## 2023-03-24 NOTE — ED PROVIDER NOTE - PROGRESS NOTE DETAILS
SESAR Richter: Spoke with OBGYN will consult in ED SESAR Richter: Pt seen by OBGYN, recommending macrobid for UTI, had NST performed with normal results. Pt reports improvement in symptoms following IV fluids. Will d/c home with OBGYN follow up, pt to return to the ER with any worsening or concerning symptoms.

## 2023-03-24 NOTE — ED PROVIDER NOTE - OBJECTIVE STATEMENT
27-year-old female at 32 weeks gestation, first pregnancy presenting to the ER with 2 days of generalized weakness, nausea, lightheadedness and 2 hours of left-sided abdominal pain last night.  Patient reports symptoms began 2 days ago, feels generalized fatigue/weakness, endorses nausea but no vomiting, also feels lightheaded and has decreased p.o.  Patient reports feeling good fetal movements.  Patient denies vaginal discharge, vaginal bleeding, pelvic pain, chest pain, shortness of breath, headache, diarrhea, dysuria, urinary frequency, recent travel or illness or any other concerns.

## 2023-03-24 NOTE — ED PROVIDER NOTE - NSFOLLOWUPINSTRUCTIONS_ED_ALL_ED_FT
Follow-up with your OB/GYN within 1 week.  Take Macrobid 100 mg (1 tablet) twice a day for 7 days.  Drink plenty of fluids.  Return to the ER with any worsening or concerning symptoms, abdominal pain, pelvic pain, nausea, vomiting, fever/chills, weakness or any other concerns.

## 2023-03-24 NOTE — ED PROVIDER NOTE - NS ED ATTENDING STATEMENT MOD
This was a shared visit with the ADOLPH. I reviewed and verified the documentation and independently performed the documented:

## 2023-03-24 NOTE — ED ADULT NURSE NOTE - SUICIDE SCREENING DEPRESSION
normal,  alert,  in no acute distress,  well developed, well nourished,  normal communication ability
Negative

## 2023-03-24 NOTE — ED PROVIDER NOTE - CLINICAL SUMMARY MEDICAL DECISION MAKING FREE TEXT BOX
27-year-old female at 32 weeks gestation, first pregnancy presenting to the ER with 2 days of generalized weakness, nausea, lightheadedness and 2 hours of left-sided abdominal pain last night (pain since resolved). On exam pt is well appearing, afebrile, vitals within normal, non tender abd, -160. Pt reports normal fetal movement. Plan: cbc/cmp, mag, phos, ua/ucx, IV fluids, EKG, OBGYN consult 27-year-old female at 32 weeks gestation, first pregnancy presenting to the ER with 2 days of generalized weakness, nausea, lightheadedness and 2 hours of left-sided abdominal pain last night (pain since resolved). On exam pt is well appearing, afebrile, vitals within normal, non tender abd, -160. Pt reports normal fetal movement. Plan: cbc/cmp, mag, phos, ua/ucx, IV fluids, EKG, OBGYN consult      ===================================  attending mdm (Tato Carpenter MD; attending emergency medicine and medical toxicology)     Patient is a 27-year-old female 32 weeks pregnant by ultrasound G1, P0 who is presenting to the emergency department generalized weakness.  Yesterday had 1 episode of abdominal pain.  Patient is well-appearing, vital signs are normal.  Has a gravid uterus.  Mucous membranes moist.  Neurologically intact moving all extremities.  CBC with hemoglobin of 10.3 no evidence of leukocytosis.,  CMP nonactionable.  Urinalysis positive for possible UTI.  Patient has no symptoms.  We will treat.  OB consulted for NST which was normal.  Follow-up her OB as necessary return precautions reviewed

## 2023-03-24 NOTE — ED PROVIDER NOTE - NSICDXPASTSURGICALHX_GEN_ALL_CORE_FT
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 C-spine disc herniation; status post laminotomy; presents today for evaluation of a transient episode of amnesia while on a vacation.\par \par Patient reports that he was on a vacation last week, on the morning of 4/17/19, he got up in the morning, as he came out of the bathroom he did not remember he had shaven, he did not remember that they were leaving for [place] in the afternoon, for about 20-30 minutes he seemed to have no recollection of what he was doing or had done, his identity was intact, he was alert oriented, as per his wife and son who accompanied him his behavior was appropriate, after about 20 minutes he was back to himself. He continues to have amnesia for a 20 minute period that is persistent. \par \par Patient denies having had any headache, denies LOC or change in mental status, denies having had any fever, denies having had diarrhea/URI or any skin rashes. PAST SURGICAL HISTORY:  No significant past surgical history

## 2023-03-24 NOTE — CHART NOTE - NSCHARTNOTEFT_GEN_A_CORE
NST reviewed:  140bpm, mod pawan, +15x15 accels, -decels  Acontractile.    Reactive NST.     RFrankel PGY4

## 2023-03-24 NOTE — CONSULT NOTE ADULT - SUBJECTIVE AND OBJECTIVE BOX
GYN Consult Note:      27y  at 32w3d (CARMEN: 23) presents with fatigue and generalized weakness x2 days.    HPI: Pt states that 2 days she started "not to feel well", that progressively became worse yesterday causing her to spend the day in bed resting. Reports feeling light headed and dizzy with fatigue. Feels steady on her feet.  Reports diminished appetite with nausea. Able to tolerate small quantities of food. Denies emesis. Pt reports poor sleep over the last several days. Pt took her BP today and reports that it was "low", but does not remember the number. Pt denies fevers, chills, congestion, visual changes, SOB, cough, dyspnea, chest pain, palpitations, abdominal / pelvic pain, diarrhea, or constipation. Pt denies any known sick contacts. Reports feeling good fetal movement. Denies LOF, VB, or CX. Feeling better with IV fluids.    OB/GYN HISTORY:   Physician: PITA Godwin   Ob:   - G1: TOP w/ medication ()   Gyn: Denies fibroids, cysts, PCOS, endometriosis  PMH: Denies  PSH: Denies   Meds: ASA, PNV   Allergies: No Known Allergies    REVIEW OF SYSTEMS: As above,     Vital Signs Last 24 Hrs  T(C): 36.4 (24 Mar 2023 17:11), Max: 36.4 (24 Mar 2023 17:11)  T(F): 97.6 (24 Mar 2023 17:11), Max: 97.6 (24 Mar 2023 17:11)  HR: 94 (24 Mar 2023 17:11) (94 - 94)  BP: 113/70 (24 Mar 2023 17:11) (113/70 - 113/70)  BP(mean): --  RR: 16 (24 Mar 2023 17:11) (16 - 16)  SpO2: 100% (24 Mar 2023 17:11) (100% - 100%)    Parameters below as of 24 Mar 2023 17:11  Patient On (Oxygen Delivery Method): room air    PHYSICAL EXAM:   Gen: NAD, alert and oriented x 3  Cardiovascular: regular   Respiratory: breathing comfortably on RA  Abd: soft, non tender, gravid abdomen appropriate for gestational age.   Extremities: NTBL  Skin: warm and well perfused    LABS:                        10.3   8.24  )-----------( 315      ( 24 Mar 2023 18:40 )             32.2         138  |  104  |  7   ----------------------------<  112<H>  3.8   |  21<L>  |  0.46<L>    Ca    9.3      24 Mar 2023 18:40  Phos  3.3       Mg     1.70         TPro  6.5  /  Alb  3.8  /  TBili  0.3  /  DBili  x   /  AST  16  /  ALT  11  /  AlkPhos  145<H>  24      Urinalysis Basic - ( 24 Mar 2023 18:40 )    Color: Light Yellow / Appearance: Slightly Turbid / S.020 / pH: x  Gluc: x / Ketone: Trace  / Bili: Negative / Urobili: <2 mg/dL   Blood: x / Protein: Trace / Nitrite: Negative   Leuk Esterase: Large / RBC: 2 /HPF / WBC 64 /HPF   Sq Epi: x / Non Sq Epi: 7 /HPF / Bacteria: Moderate      RADIOLOGY & ADDITIONAL STUDIES:

## 2023-03-26 LAB
CULTURE RESULTS: SIGNIFICANT CHANGE UP
SPECIMEN SOURCE: SIGNIFICANT CHANGE UP

## 2023-04-12 ENCOUNTER — APPOINTMENT (OUTPATIENT)
Dept: ANTEPARTUM | Facility: CLINIC | Age: 28
End: 2023-04-12

## 2023-04-14 ENCOUNTER — APPOINTMENT (OUTPATIENT)
Dept: ANTEPARTUM | Facility: CLINIC | Age: 28
End: 2023-04-14
Payer: MEDICAID

## 2023-04-14 ENCOUNTER — ASOB RESULT (OUTPATIENT)
Age: 28
End: 2023-04-14

## 2023-04-14 PROCEDURE — 99202 OFFICE O/P NEW SF 15 MIN: CPT | Mod: TH,25

## 2023-04-14 PROCEDURE — 76819 FETAL BIOPHYS PROFIL W/O NST: CPT

## 2023-04-14 PROCEDURE — 76816 OB US FOLLOW-UP PER FETUS: CPT

## 2023-04-14 PROCEDURE — 76820 UMBILICAL ARTERY ECHO: CPT | Mod: 59

## 2023-04-20 ENCOUNTER — ASOB RESULT (OUTPATIENT)
Age: 28
End: 2023-04-20

## 2023-04-20 ENCOUNTER — APPOINTMENT (OUTPATIENT)
Dept: ANTEPARTUM | Facility: CLINIC | Age: 28
End: 2023-04-20
Payer: MEDICAID

## 2023-04-20 PROCEDURE — 76820 UMBILICAL ARTERY ECHO: CPT

## 2023-04-20 PROCEDURE — 76818 FETAL BIOPHYS PROFILE W/NST: CPT

## 2023-04-25 ENCOUNTER — TRANSCRIPTION ENCOUNTER (OUTPATIENT)
Age: 28
End: 2023-04-25

## 2023-04-25 ENCOUNTER — INPATIENT (INPATIENT)
Facility: HOSPITAL | Age: 28
LOS: 4 days | Discharge: HOME | End: 2023-04-30
Attending: STUDENT IN AN ORGANIZED HEALTH CARE EDUCATION/TRAINING PROGRAM | Admitting: STUDENT IN AN ORGANIZED HEALTH CARE EDUCATION/TRAINING PROGRAM
Payer: MEDICAID

## 2023-04-25 VITALS
HEART RATE: 90 BPM | RESPIRATION RATE: 15 BRPM | DIASTOLIC BLOOD PRESSURE: 71 MMHG | SYSTOLIC BLOOD PRESSURE: 109 MMHG | TEMPERATURE: 98 F

## 2023-04-25 DIAGNOSIS — O26.899 OTHER SPECIFIED PREGNANCY RELATED CONDITIONS, UNSPECIFIED TRIMESTER: ICD-10-CM

## 2023-04-25 LAB
ANISOCYTOSIS BLD QL: SIGNIFICANT CHANGE UP
BASOPHILS # BLD AUTO: 0 K/UL — SIGNIFICANT CHANGE UP (ref 0–0.2)
BASOPHILS NFR BLD AUTO: 0 % — SIGNIFICANT CHANGE UP (ref 0–2)
BLD GP AB SCN SERPL QL: NEGATIVE — SIGNIFICANT CHANGE UP
EOSINOPHIL # BLD AUTO: 0.1 K/UL — SIGNIFICANT CHANGE UP (ref 0–0.5)
EOSINOPHIL NFR BLD AUTO: 0.9 % — SIGNIFICANT CHANGE UP (ref 0–6)
GIANT PLATELETS BLD QL SMEAR: PRESENT — SIGNIFICANT CHANGE UP
HCT VFR BLD CALC: 34.3 % — LOW (ref 34.5–45)
HGB BLD-MCNC: 10.9 G/DL — LOW (ref 11.5–15.5)
IANC: 7.93 K/UL — HIGH (ref 1.8–7.4)
LYMPHOCYTES # BLD AUTO: 18.9 % — SIGNIFICANT CHANGE UP (ref 13–44)
LYMPHOCYTES # BLD AUTO: 2.17 K/UL — SIGNIFICANT CHANGE UP (ref 1–3.3)
MANUAL SMEAR VERIFICATION: SIGNIFICANT CHANGE UP
MCHC RBC-ENTMCNC: 23.7 PG — LOW (ref 27–34)
MCHC RBC-ENTMCNC: 31.8 GM/DL — LOW (ref 32–36)
MCV RBC AUTO: 74.7 FL — LOW (ref 80–100)
MICROCYTES BLD QL: SIGNIFICANT CHANGE UP
MONOCYTES # BLD AUTO: 0.54 K/UL — SIGNIFICANT CHANGE UP (ref 0–0.9)
MONOCYTES NFR BLD AUTO: 4.7 % — SIGNIFICANT CHANGE UP (ref 2–14)
NEUTROPHILS # BLD AUTO: 8.24 K/UL — HIGH (ref 1.8–7.4)
NEUTROPHILS NFR BLD AUTO: 71.7 % — SIGNIFICANT CHANGE UP (ref 43–77)
PLAT MORPH BLD: NORMAL — SIGNIFICANT CHANGE UP
PLATELET # BLD AUTO: 340 K/UL — SIGNIFICANT CHANGE UP (ref 150–400)
PLATELET COUNT - ESTIMATE: NORMAL — SIGNIFICANT CHANGE UP
POIKILOCYTOSIS BLD QL AUTO: SLIGHT — SIGNIFICANT CHANGE UP
POLYCHROMASIA BLD QL SMEAR: SLIGHT — SIGNIFICANT CHANGE UP
RBC # BLD: 4.59 M/UL — SIGNIFICANT CHANGE UP (ref 3.8–5.2)
RBC # FLD: 13.8 % — SIGNIFICANT CHANGE UP (ref 10.3–14.5)
RBC BLD AUTO: ABNORMAL
RH IG SCN BLD-IMP: POSITIVE — SIGNIFICANT CHANGE UP
RH IG SCN BLD-IMP: POSITIVE — SIGNIFICANT CHANGE UP
SMUDGE CELLS # BLD: PRESENT — SIGNIFICANT CHANGE UP
VARIANT LYMPHS # BLD: 3.8 % — SIGNIFICANT CHANGE UP (ref 0–6)
WBC # BLD: 11.49 K/UL — HIGH (ref 3.8–10.5)
WBC # FLD AUTO: 11.49 K/UL — HIGH (ref 3.8–10.5)

## 2023-04-25 RX ORDER — FAMOTIDINE 10 MG/ML
20 INJECTION INTRAVENOUS ONCE
Refills: 0 | Status: COMPLETED | OUTPATIENT
Start: 2023-04-25 | End: 2023-04-25

## 2023-04-25 RX ORDER — OXYCODONE HYDROCHLORIDE 5 MG/1
10 TABLET ORAL
Refills: 0 | Status: DISCONTINUED | OUTPATIENT
Start: 2023-04-26 | End: 2023-04-26

## 2023-04-25 RX ORDER — OXYCODONE HYDROCHLORIDE 5 MG/1
5 TABLET ORAL
Refills: 0 | Status: DISCONTINUED | OUTPATIENT
Start: 2023-04-26 | End: 2023-04-27

## 2023-04-25 RX ORDER — CITRIC ACID/SODIUM CITRATE 300-500 MG
30 SOLUTION, ORAL ORAL ONCE
Refills: 0 | Status: DISCONTINUED | OUTPATIENT
Start: 2023-04-25 | End: 2023-04-26

## 2023-04-25 RX ORDER — SODIUM CHLORIDE 9 MG/ML
1000 INJECTION, SOLUTION INTRAVENOUS
Refills: 0 | Status: DISCONTINUED | OUTPATIENT
Start: 2023-04-25 | End: 2023-04-26

## 2023-04-25 RX ORDER — CHLORHEXIDINE GLUCONATE 213 G/1000ML
1 SOLUTION TOPICAL ONCE
Refills: 0 | Status: COMPLETED | OUTPATIENT
Start: 2023-04-25 | End: 2023-04-25

## 2023-04-25 RX ORDER — OXYTOCIN 10 UNIT/ML
333.33 VIAL (ML) INJECTION
Qty: 20 | Refills: 0 | Status: DISCONTINUED | OUTPATIENT
Start: 2023-04-25 | End: 2023-04-26

## 2023-04-25 RX ORDER — FAMOTIDINE 10 MG/ML
20 INJECTION INTRAVENOUS DAILY
Refills: 0 | Status: DISCONTINUED | OUTPATIENT
Start: 2023-04-25 | End: 2023-04-25

## 2023-04-25 RX ORDER — DIPHENHYDRAMINE HCL 50 MG
25 CAPSULE ORAL EVERY 4 HOURS
Refills: 0 | Status: DISCONTINUED | OUTPATIENT
Start: 2023-04-26 | End: 2023-04-28

## 2023-04-25 RX ORDER — NALOXONE HYDROCHLORIDE 4 MG/.1ML
0.1 SPRAY NASAL
Refills: 0 | Status: DISCONTINUED | OUTPATIENT
Start: 2023-04-26 | End: 2023-04-28

## 2023-04-25 RX ADMIN — FAMOTIDINE 20 MILLIGRAM(S): 10 INJECTION INTRAVENOUS at 23:48

## 2023-04-25 RX ADMIN — CHLORHEXIDINE GLUCONATE 1 APPLICATION(S): 213 SOLUTION TOPICAL at 23:37

## 2023-04-25 NOTE — OB POSTPARTUM EVENT NOTE - NS_EVENTFINDINGS1_OBGYN_ALL_OB_FT
As per MD Pink and MD Muhammad, pt to be switched from LR maintenance fluids to NS maintenance fluid running at 50cc/hr

## 2023-04-25 NOTE — OB PROVIDER H&P - PROBLEM SELECTOR PLAN 1
Dx: labor, breech  ordered/sent admission labs  discussed blood transfusion consent with pt, pt verbalized understanding and signed  GBS unknown, pt states it was performed  EFW 2637    PO 4pm, meal. will ne NPO at 12am    plan discussed with Dr Milan and Residents at L&D Pod  Called [facility], anesthesia and Dr Milan unavailable. d/w Dr DONA Muhammad PGY3 and Charge Nurse Mounika Alexis.

## 2023-04-25 NOTE — OB PROVIDER H&P - HISTORY OF PRESENT ILLNESS
28 y/o female  @ 37GA with SLIUP, PNC complicated by breech presentation, presents to r/o labor. Reports contractions q 10 minutes x 4 hours rated at 10/10 in pain, defers medication. Reports GFM. denies LOF, VB, HA, V, D, fever, chills.  last ate at 4pm, meal  MFM note in HIE reports poor fetal growth. EFW on  5#1, 17%    med hx: denies  surg hx: denies   Ob: primigravida  Gyn: denies hx fibroids, abnormal pap smears, cysts, STDs, HSV  Allergies: NKDA  Medications: PNV, vit D, ASA  Psych: denies  Social: denies alc/drugs/tobacco

## 2023-04-25 NOTE — OB PROVIDER H&P - NSLOWPPHRISK_OBGYN_A_OB
No previous uterine incision/Espitia Pregnancy/Less than or equal to 4 previous vaginal births/No known bleeding disorder/No history of postpartum hemorrhage

## 2023-04-25 NOTE — OB PROVIDER H&P - NSHPPHYSICALEXAM_GEN_ALL_CORE
PT DAILY TREATMENT NOTE     Patient Name: Joe Keyes  Date:2021  : 1997  [x]  Patient  Verified  Payor: [employer] [place] / Plan: [address] [place] [address] / Product Type: PPO /    In time: 417  Out time: 1710  Total Treatment Time (min): 60  Visit #: 7 of 8    Medicare/BCBS Only   Total Timed Codes (min):  60 1:1 Treatment Time:  60       Treatment Area: Foot pain, right [M79.671]    SUBJECTIVE  Pain Level (0-10 scale): 0/10  Any medication changes, allergies to medications, adverse drug reactions, diagnosis change, or new procedure performed?: [x] No    [] Yes (see summary sheet for update)  Subjective functional status/changes:   [x] No changes reported, no new complaints     OBJECTIVE    30 min Therapeutic Exercise:  [x]? ? See flow sheet : mobility and strengthening   Rationale: increase ROM, increase strength and improve coordination to improve the patients ability to tolerate increased activity     15 min Therapeutic Activity:  [x]? ?  See flow sheet : squatting, practicing normalized gait, heel/toe rocking   Rationale: increase ROM, increase strength, improve coordination and improve balance  to improve the patients ability to return to functional movements     15 min Manual Therapy:  PF/DF with overpressure, great toe flex/ext with overpressure, ankle distraction, posterior TC glides, IASTM to the plantar fascia, great toe flexor and extensor tendons, achilles tendon   The manual therapy interventions were performed at a separate and distinct time from the therapeutic activities interventions.   Rationale: decrease pain, increase ROM, increase tissue extensibility and decrease trigger points to ease transition to normal amb     With   [x] TE   [] TA   [] neuro   [] other: Patient Education: [x] Review HEP    [] Progressed/Changed HEP based on:   [] positioning   [] body mechanics   [] transfers   [] heat/ice application    [] other:      Other Objective/Functional Measures:   --added standing IV/EV with balance board  --initiated TM warmup 5' at 1.2 mph  --increased rounds of SLS cone tapping   --increased great toe mobility    Pain Level (0-10 scale) post treatment: 0/10    ASSESSMENT/Changes in Function: Pt responds well to session today, pt is able to tolerate increased activity, beginning session with TM warmup. Pt is progressing well towards goals. Patient will continue to benefit from skilled PT services to modify and progress therapeutic interventions, address functional mobility deficits, address ROM deficits, address strength deficits, analyze and address soft tissue restrictions, analyze and cue movement patterns, analyze and modify body mechanics/ergonomics and assess and modify postural abnormalities to attain remaining goals. []  See Plan of Care  []  See progress note/recertification  []  See Discharge Summary         Progress towards goals / Updated goals:  Updated Goals: to be achieved in 10 treatments:  1. Pt will demonstrate AROM DF 15 degrees or more for safe floor clearance during gait.             Last PN: HOLD - waiting for wt bearing clearing from MD              Current: Progressing, AROM 10* DF today 2/23/21  2. Pt will demonstrate AROM IV at least 20 deg and EV at least 10 deg for improved ankle strategy for safer SL balance.              Last PN: Progressing - IV 16, EV 10              Current: Progressing, IV 10*, EV 10* 2/19/21  3. Pt will demonstrate MMT at least 4+/5 ankle IV for increased ankle stability to maintain safe gait.                Last PN:  Progressing - IV 3+/5              Current: Progressing, IV 4/5 (lacking full range) 2/19/21  4. Pt will demonstrate right SLS at least 20sec for improved stability and safer ankle strategy.             Last PN: HOLD - waiting for wt bearing clearing from MD              Current: MET pt able to perform 3x30\" with minimal sway, no LOB 2/16/21  5.  Pt will score FOTO 68 or more to demonstrate improved functional ability with daily tasks.              Last PN: Progressing - 64              Current: Progressing, FOTO score 65 today 2/23/21    PLAN  []  Upgrade activities as tolerated     []  Continue plan of care  []  Update interventions per flow sheet       []  Discharge due to:_  []  Other:_      Leonardo Monk PTA 2/23/2021  4:14 PM    Future Appointments   Date Time Provider Dianelys Rivera   2/23/2021  4:15 PM Roel Caicedo [facility]   2/25/2021  4:15 PM Roel Caicedo [facility]   3/1/2021  4:15 PM Tangela Arista [facility]   3/3/2021  4:15 PM Ange Becerril PT [facility]   3/8/2021  4:15 PM Tangela Arista [facility]   3/10/2021  4:15 PM Avelino Mcintosh PTA [facility] Vital Signs Last 24 Hrs  T(C): 36.8 (25 Apr 2023 21:43), Max: 36.8 (25 Apr 2023 21:28)  T(F): 98.2 (25 Apr 2023 21:43), Max: 98.24 (25 Apr 2023 21:28)  HR: 90 (25 Apr 2023 21:43) (90 - 90)  BP: 109/71 (25 Apr 2023 21:43) (109/71 - 109/71)  BP(mean): --  RR: 15 (25 Apr 2023 21:43) (15 - 15)  SpO2: --    Parameters below as of 25 Apr 2023 21:43  Patient On (Oxygen Delivery Method): room air    gen: a/o x 3, in on apparent distress  abd: soft and nontender, gravid  SVE: 2.5/50/-3  TAS: breech by sono, anterior placenta, GFM, FHR+, good fluid visualized   EFM: baseline 145 with mod variability, pos accels  TOCO: irregular frequent contractions Vital Signs Last 24 Hrs  T(C): 36.8 (25 Apr 2023 21:43), Max: 36.8 (25 Apr 2023 21:28)  T(F): 98.2 (25 Apr 2023 21:43), Max: 98.24 (25 Apr 2023 21:28)  HR: 90 (25 Apr 2023 21:43) (90 - 90)  BP: 109/71 (25 Apr 2023 21:43) (109/71 - 109/71)  BP(mean): --  RR: 15 (25 Apr 2023 21:43) (15 - 15)  SpO2: --    Parameters below as of 25 Apr 2023 21:43  Patient On (Oxygen Delivery Method): room air    gen: a/o x 3, in on apparent distress  abd: soft and nontender, gravid  SVE: 2.5/50/-3. mild red spotting on exam gloves. no evidence LOF/pooling at external genitalia.  TAS: breech by sono, anterior placenta, GFM, FHR+, good fluid visualized   EFM: baseline 145 with mod variability, pos accels  TOCO: irregular frequent contractions

## 2023-04-25 NOTE — OB RN TRIAGE NOTE - FALL HARM RISK - UNIVERSAL INTERVENTIONS
Bed in lowest position, wheels locked, appropriate side rails in place/Call bell, personal items and telephone in reach/Instruct patient to call for assistance before getting out of bed or chair/Non-slip footwear when patient is out of bed/[place] to call system/Physically safe environment - no spills, clutter or unnecessary equipment/Purposeful Proactive Rounding/Room/bathroom lighting operational, light cord in reach

## 2023-04-25 NOTE — OB RN PATIENT PROFILE - FALL HARM RISK - UNIVERSAL INTERVENTIONS
Bed in lowest position, wheels locked, appropriate side rails in place/Call bell, personal items and telephone in reach/Instruct patient to call for assistance before getting out of bed or chair/Non-slip footwear when patient is out of bed/[place] to call system/Physically safe environment - no spills, clutter or unnecessary equipment/Purposeful Proactive Rounding/Room/bathroom lighting operational, light cord in reach

## 2023-04-25 NOTE — OB PROVIDER H&P - ASSESSMENT
28 y/o female  @ 37GA with SLIUP, PNC complicated by breech presentation, admitted for c/s  MFM note(HIE): poor fetal growth    breech by sono  irregular frequent contractions  2.5/50/-3    Dx: labor, breech  ordered/sent admission labs  discussed consents with pt, pt verbalized understanding and signed  GBS unknown, pt states it was performed  EFW 2637    PO 4pm, meal. will ne NPO at 12am    plan discussed with Dr Milan and Residents at L&D Pod  Called [facility], anesthesia and Dr Milan unavailable. d/w Dr DONA Muhammad PGY3 and Charge Nurse Mounika Alexis. 28 y/o female  @ 37GA with SLIUP, PNC complicated by breech presentation, admitted for c/s  MFM note(HIE): poor fetal growth    breech by sono  irregular frequent contractions  2.5/50/-3    Dx: labor, breech  ordered/sent admission labs  discussed blood transfusion consent with pt, pt verbalized understanding and signed  GBS unknown, pt states it was performed  EFW 2637    PO 4pm, meal. will ne NPO at 12am    plan discussed with Dr Milan and Residents at L&D Pod  Called [facility], anesthesia and Dr Milan unavailable. d/w Dr DONA Muhammad PGY3 and Charge Nurse Mounika Alexis.

## 2023-04-25 NOTE — OB POSTPARTUM EVENT NOTE - NS_EVENTSUMMARY1_OBGYN_ALL_OB_FT
Charge MARY KAY corneliusted MARY KAY Mari in PACU 7 pt preop labs resulted, lab called Mg level 7.4, Calcium 6.4. MD Pink made aware

## 2023-04-26 LAB
BASOPHILS # BLD AUTO: 0.01 K/UL — SIGNIFICANT CHANGE UP (ref 0–0.2)
BASOPHILS NFR BLD AUTO: 0.1 % — SIGNIFICANT CHANGE UP (ref 0–2)
COVID-19 SPIKE DOMAIN AB INTERP: POSITIVE
COVID-19 SPIKE DOMAIN ANTIBODY RESULT: >250 U/ML — HIGH
EOSINOPHIL # BLD AUTO: 0 K/UL — SIGNIFICANT CHANGE UP (ref 0–0.5)
EOSINOPHIL NFR BLD AUTO: 0 % — SIGNIFICANT CHANGE UP (ref 0–6)
FIBRINOGEN PPP-MCNC: 356 MG/DL — SIGNIFICANT CHANGE UP (ref 200–465)
HCT VFR BLD CALC: 19.4 % — CRITICAL LOW (ref 34.5–45)
HCT VFR BLD CALC: 21.7 % — LOW (ref 34.5–45)
HGB BLD-MCNC: 6.1 G/DL — CRITICAL LOW (ref 11.5–15.5)
HGB BLD-MCNC: 6.8 G/DL — CRITICAL LOW (ref 11.5–15.5)
IANC: 12.73 K/UL — HIGH (ref 1.8–7.4)
IMM GRANULOCYTES NFR BLD AUTO: 0.5 % — SIGNIFICANT CHANGE UP (ref 0–0.9)
INR BLD: 1.06 RATIO — SIGNIFICANT CHANGE UP (ref 0.88–1.16)
LYMPHOCYTES # BLD AUTO: 1.51 K/UL — SIGNIFICANT CHANGE UP (ref 1–3.3)
LYMPHOCYTES # BLD AUTO: 10.2 % — LOW (ref 13–44)
MCHC RBC-ENTMCNC: 23.1 PG — LOW (ref 27–34)
MCHC RBC-ENTMCNC: 23.8 PG — LOW (ref 27–34)
MCHC RBC-ENTMCNC: 31.3 GM/DL — LOW (ref 32–36)
MCHC RBC-ENTMCNC: 31.4 GM/DL — LOW (ref 32–36)
MCV RBC AUTO: 73.6 FL — LOW (ref 80–100)
MCV RBC AUTO: 75.8 FL — LOW (ref 80–100)
MONOCYTES # BLD AUTO: 0.52 K/UL — SIGNIFICANT CHANGE UP (ref 0–0.9)
MONOCYTES NFR BLD AUTO: 3.5 % — SIGNIFICANT CHANGE UP (ref 2–14)
NEUTROPHILS # BLD AUTO: 12.73 K/UL — HIGH (ref 1.8–7.4)
NEUTROPHILS NFR BLD AUTO: 85.7 % — HIGH (ref 43–77)
NRBC # BLD: 0 /100 WBCS — SIGNIFICANT CHANGE UP (ref 0–0)
NRBC # BLD: 0 /100 WBCS — SIGNIFICANT CHANGE UP (ref 0–0)
NRBC # FLD: 0 K/UL — SIGNIFICANT CHANGE UP (ref 0–0)
NRBC # FLD: 0 K/UL — SIGNIFICANT CHANGE UP (ref 0–0)
PLATELET # BLD AUTO: 256 K/UL — SIGNIFICANT CHANGE UP (ref 150–400)
PLATELET # BLD AUTO: 264 K/UL — SIGNIFICANT CHANGE UP (ref 150–400)
PROTHROM AB SERPL-ACNC: 12.3 SEC — SIGNIFICANT CHANGE UP (ref 10.5–13.4)
RBC # BLD: 2.56 M/UL — LOW (ref 3.8–5.2)
RBC # BLD: 2.95 M/UL — LOW (ref 3.8–5.2)
RBC # FLD: 13.5 % — SIGNIFICANT CHANGE UP (ref 10.3–14.5)
RBC # FLD: 13.7 % — SIGNIFICANT CHANGE UP (ref 10.3–14.5)
SARS-COV-2 IGG+IGM SERPL QL IA: >250 U/ML — HIGH
SARS-COV-2 IGG+IGM SERPL QL IA: POSITIVE
T PALLIDUM AB TITR SER: NEGATIVE — SIGNIFICANT CHANGE UP
WBC # BLD: 13.96 K/UL — HIGH (ref 3.8–10.5)
WBC # BLD: 14.85 K/UL — HIGH (ref 3.8–10.5)
WBC # FLD AUTO: 13.96 K/UL — HIGH (ref 3.8–10.5)
WBC # FLD AUTO: 14.85 K/UL — HIGH (ref 3.8–10.5)

## 2023-04-26 PROCEDURE — 74177 CT ABD & PELVIS W/CONTRAST: CPT | Mod: 26

## 2023-04-26 DEVICE — SURGICEL POWDER 3 GRAMS: Type: IMPLANTABLE DEVICE | Status: FUNCTIONAL

## 2023-04-26 RX ORDER — SODIUM CHLORIDE 9 MG/ML
1000 INJECTION, SOLUTION INTRAVENOUS
Refills: 0 | Status: DISCONTINUED | OUTPATIENT
Start: 2023-04-26 | End: 2023-04-26

## 2023-04-26 RX ORDER — ONDANSETRON 8 MG/1
4 TABLET, FILM COATED ORAL ONCE
Refills: 0 | Status: COMPLETED | OUTPATIENT
Start: 2023-04-26 | End: 2023-04-26

## 2023-04-26 RX ORDER — METOCLOPRAMIDE HCL 10 MG
10 TABLET ORAL ONCE
Refills: 0 | Status: COMPLETED | OUTPATIENT
Start: 2023-04-26 | End: 2023-04-26

## 2023-04-26 RX ORDER — HEPARIN SODIUM 5000 [USP'U]/ML
5000 INJECTION INTRAVENOUS; SUBCUTANEOUS EVERY 12 HOURS
Refills: 0 | Status: DISCONTINUED | OUTPATIENT
Start: 2023-04-26 | End: 2023-04-26

## 2023-04-26 RX ORDER — OXYTOCIN 10 UNIT/ML
333.33 VIAL (ML) INJECTION
Qty: 20 | Refills: 0 | Status: DISCONTINUED | OUTPATIENT
Start: 2023-04-26 | End: 2023-04-26

## 2023-04-26 RX ORDER — SODIUM CHLORIDE 9 MG/ML
1000 INJECTION, SOLUTION INTRAVENOUS ONCE
Refills: 0 | Status: COMPLETED | OUTPATIENT
Start: 2023-04-26 | End: 2023-04-26

## 2023-04-26 RX ORDER — MORPHINE SULFATE 50 MG/1
0.1 CAPSULE, EXTENDED RELEASE ORAL ONCE
Refills: 0 | Status: DISCONTINUED | OUTPATIENT
Start: 2023-04-26 | End: 2023-04-26

## 2023-04-26 RX ORDER — SODIUM CHLORIDE 9 MG/ML
500 INJECTION, SOLUTION INTRAVENOUS ONCE
Refills: 0 | Status: COMPLETED | OUTPATIENT
Start: 2023-04-26 | End: 2023-04-26

## 2023-04-26 RX ORDER — ONDANSETRON 8 MG/1
4 TABLET, FILM COATED ORAL EVERY 6 HOURS
Refills: 0 | Status: COMPLETED | OUTPATIENT
Start: 2023-04-26 | End: 2023-04-27

## 2023-04-26 RX ORDER — CITRIC ACID/SODIUM CITRATE 300-500 MG
15 SOLUTION, ORAL ORAL ONCE
Refills: 0 | Status: COMPLETED | OUTPATIENT
Start: 2023-04-26 | End: 2023-04-26

## 2023-04-26 RX ADMIN — Medication 10 MILLIGRAM(S): at 04:46

## 2023-04-26 RX ADMIN — Medication 1000 MILLIUNIT(S)/MIN: at 02:01

## 2023-04-26 RX ADMIN — SODIUM CHLORIDE 1000 MILLILITER(S): 9 INJECTION, SOLUTION INTRAVENOUS at 07:35

## 2023-04-26 RX ADMIN — Medication 25 MILLIGRAM(S): at 18:10

## 2023-04-26 RX ADMIN — ONDANSETRON 4 MILLIGRAM(S): 8 TABLET, FILM COATED ORAL at 03:07

## 2023-04-26 RX ADMIN — SODIUM CHLORIDE 75 MILLILITER(S): 9 INJECTION, SOLUTION INTRAVENOUS at 04:47

## 2023-04-26 RX ADMIN — SODIUM CHLORIDE 1000 MILLILITER(S): 9 INJECTION, SOLUTION INTRAVENOUS at 16:34

## 2023-04-26 NOTE — CHART NOTE - NSCHARTNOTEFT_GEN_A_CORE
Patient seen and examined at bedside. Only reports mild fatigue and denies other complaints. Denies CP, SOB, palpitations, dizziness, N/V, fevers, and chills.    Vital Signs Last 24 Hours  T(C): 36.5 (23 @ 20:30), Max: 37.1 (23 @ 22:45)  HR: 88 (23 @ 20:30) (67 - 99)  BP: 106/65 (23 @ 20:30) (93/55 - 126/82)  RR: 18 (23 @ 20:30) (7 - 21)  SpO2: 100% (23 @ 20:30) (98% - 100%)    I&O's Summary    2023 07:  -  2023 07:00  --------------------------------------------------------  IN: 2200 mL / OUT: 1046 mL / NET: 1154 mL    2023 07:01  -  2023 21:10  --------------------------------------------------------  IN: 0 mL / OUT: 375 mL / NET: -375 mL        Physical Exam:  General: NAD  Abdomen: Soft, appropriately tender, non-distended, no rebound or guarding, uterus firm below the umbilicus  Incision: Pfannenstiel incision is CDI  : Lochia wnl, Lr catheter in place  Ext: No pain or swelling    Labs:             6.1<LL>  13.96<H> )-----------( 256      (  @ 17:17 )             19.4<LL>               6.8<LL>  14.85<H> )-----------( 264      (  @ 16:15 )             21.7<L>               10.9<L>  11.49<H> )-----------( 340      (  @ 22:50 )             34.3<L>        MEDICATIONS  (STANDING):  acetaminophen     Tablet .. 975 milliGRAM(s) Oral <User Schedule>  diphtheria/tetanus/pertussis (acellular) Vaccine (Adacel) 0.5 milliLiter(s) IntraMuscular once  heparin   Injectable 5000 Unit(s) SubCutaneous every 12 hours  ibuprofen  Tablet. 600 milliGRAM(s) Oral every 6 hours  ketorolac   Injectable 30 milliGRAM(s) IV Push every 6 hours  lactated ringers Bolus 500 milliLiter(s) IV Bolus once    MEDICATIONS  (PRN):  dexAMETHasone  Injectable 4 milliGRAM(s) IV Push every 6 hours PRN Nausea  diphenhydrAMINE 25 milliGRAM(s) Oral every 6 hours PRN Pruritus  diphenhydrAMINE 25 milliGRAM(s) Oral every 4 hours PRN Pruritus  lanolin Ointment 1 Application(s) Topical every 6 hours PRN Sore Nipples  magnesium hydroxide Suspension 30 milliLiter(s) Oral two times a day PRN Constipation  naloxone Injectable 0.1 milliGRAM(s) IV Push every 3 minutes PRN For ANY of the following changes in patient status:  A. Breaths Per Minute LESS THAN 10, B. Oxygen saturation LESS THAN 90%, C. Sedation score of 6 for Stop After: 4 Times  ondansetron Injectable 4 milliGRAM(s) IV Push every 6 hours PRN Nausea  oxyCODONE    IR 5 milliGRAM(s) Oral once PRN Moderate to Severe Pain (4-10)  oxyCODONE    IR 5 milliGRAM(s) Oral every 3 hours PRN Mild Pain (1 - 3)  oxyCODONE    IR 10 milliGRAM(s) Oral every 3 hours PRN Moderate Pain (4 - 6)  oxyCODONE    IR 5 milliGRAM(s) Oral every 3 hours PRN Moderate to Severe Pain (4-10)  simethicone 80 milliGRAM(s) Chew every 4 hours PRN Gas      RADIOLOGY  < from: CT Abdomen and Pelvis w/ IV Cont (23 @ 19:41) >    ******PRELIMINARY REPORT******       INTERPRETATION:  Small pneumoperitoneum, likely related to recent    section. Foci of air seen within the anterior lower abdominal   wall and anterior lower subcutaneous tissues consistent with recent    section.  Lr catheter.  Post gravid uterus with postsurgical changes. Myometrial vascularity   related to recent pregnancy.  No evidence of GI bleed.  Small volume ascites.    Follow up final report.    ******PRELIMINARY REPORT******      < end of copied text >      27yyo POD#0 s/p pLTCS 2/2 breech with  with downtrending H/H that is not reflective of the documented QBL. Patient currently receiving 2u pRBCs. CTAP prelim report showing no evidence of intraabdominal bleed. Patient with stable vital signs at this time.     - will obtain post-transfusion CBC along with CMP, lactate, and coags  - strict I/Os and q1h vital signs  - f/u final CT AP read      Isaura Mora PGY1

## 2023-04-26 NOTE — OB NEONATOLOGY/PEDIATRICIAN DELIVERY SUMMARY - NSPEDSNEONOTESA_OBGYN_ALL_OB_FT
Called to delivery for breech presentation. 37.1 wk female born via CS to a 27 y/o  blood type B+ mother. Prenatal history of breech presentation. PNL -/-/NR, Rubella not immune, GBS - on 3/30. AROM at TOD with clear fluids. Baby emerged vigorous, crying, was w/d/s/s with APGARS of 9/9. Nuchal x2. Mom plans to initiate breastfeeding, consents Hep B vaccine. EOS 0.07. SGA.    Physical Exam (Post-Delivery)  Gen: NAD; well-appearing  HEENT: NC/AT; anterior fontanelle open and flat; ears and nose clinically patent, normally set; no tags, no cleft palate appreciated  Skin: pink, warm, well-perfused, no rash  Resp: non-labored breathing  Abd: soft, NT/ND; no masses appreciated  Extremities: moving all extremities, no crepitus; hips negative O/B; +hips hyperflexed at rest  MSK: no clavicular fracture appreciated  : Jeromy I; no abnormalities; anus patent  Back: no sacral dimple  Neuro: +yue, +babinski, +grasp, good tone throughout Called to delivery for breech presentation. 37.1 wk female born via CS to a 29 y/o  blood type B+ mother. Prenatal history of breech presentation. PNL -/-/NR, Rubella not immune, GBS - on 3/30. AROM at TOD with clear fluids. Baby emerged vigorous, crying, was w/d/s/s with APGARS of 9/9. Nuchal x2. Mom plans to initiate breastfeeding, consents Hep B vaccine. EOS 0.07.    Physical Exam (Post-Delivery)  Gen: NAD; well-appearing  HEENT: NC/AT; anterior fontanelle open and flat; ears and nose clinically patent, normally set; no tags, no cleft palate appreciated  Skin: pink, warm, well-perfused, no rash  Resp: non-labored breathing  Abd: soft, NT/ND; no masses appreciated  Extremities: moving all extremities, no crepitus; hips negative O/B; +hips hyperflexed at rest  MSK: no clavicular fracture appreciated  : Jeromy I; no abnormalities; anus patent  Back: no sacral dimple  Neuro: +yue, +babinski, +grasp, good tone throughout

## 2023-04-26 NOTE — DISCHARGE NOTE OB - NS MD DC FALL RISK RISK
For information on Fall & Injury Prevention, visit: [email or web address] OR  [email or web address] OR  https://www.cdc.gov/steadi/patient.html

## 2023-04-26 NOTE — OB RN DELIVERY SUMMARY - NS_SEPSISRSKCALC_OBGYN_ALL_OB_FT
EOS calculated successfully. EOS Risk Factor: 0.5/1000 live births ([facility] national incidence); GA=37w1d; Temp=98.8; ROM=0.017; GBS='Negative'; Antibiotics='No antibiotics or any antibiotics < 2 hrs prior to birth'

## 2023-04-26 NOTE — DISCHARGE NOTE OB - CARE PLAN
1 Principal Discharge DX:	 delivery delivered  Assessment and plan of treatment:	postoperative care

## 2023-04-26 NOTE — CHART NOTE - NSCHARTNOTEFT_GEN_A_CORE
physician alerted for critical H/H of 6.8/ 21.1. Patient is POD #0 of primary  section.   Patient feels a little weak, Light headed no palpitations  uterus is firm incision in tact, abdomen soft appropriately tender no distended     A/P  repeat CBC  2 PRBC stat  stat CT abdomen / pelvis with contrast

## 2023-04-26 NOTE — PROVIDER CONTACT NOTE (OTHER) - ASSESSMENT
Uterus firm at umbilicus with scant bleeding. All VSS. Urine output 5am-6am = 23cc. Urine output 6am-7am = 20cc. Based on patient kg - adequate urine output 27cc/hr. Urine concentrated in appearance
Pt already received 1500cc bolus in PACU for low urine output

## 2023-04-26 NOTE — OB PROVIDER DELIVERY SUMMARY - NSPROVIDERDELIVERYNOTE_OBGYN_ALL_OB_FT
Patient taken to OR for PCS for breech presentation in labor. Live female  delivered via breech extraction via LUST incision. Cord clamped and cut after 30 seconds. Placenta delivered intact. Hysterotomy closed in 2 layers with vicryl. Extra 20 units pitocin and methergine x1 given for uteirne atony. Tone noted to improve. Normal tubes and ovaries bilaterally.  Fascia closed with 0 vicryl. Subcutaneous layer reapproximated with 2-0 plain. Skin closed with vicryl. Patient taken to OR for PCS for breech presentation in labor. Live female  delivered via breech extraction via LUST incision. Nuchal cord x2. Cord clamped and cut after 30 seconds. Placenta delivered intact. Hysterotomy closed in 2 layers with vicryl. Extra 20 units pitocin and methergine x1 given for uteirne atony. Tone noted to improve. Normal tubes and ovaries bilaterally.  Fascia closed with 0 vicryl. Subcutaneous layer reapproximated with 2-0 plain. Skin closed with vicryl.

## 2023-04-26 NOTE — DISCHARGE NOTE OB - PATIENT PORTAL LINK FT
You can access the FollowMyHealth Patient Portal offered by [facility] by registering at the following website: [email or web address]. By joining [employer]’s FollowMyHealth portal, you will also be able to view your health information using other applications (apps) compatible with our system.

## 2023-04-26 NOTE — DISCHARGE NOTE OB - MEDICATION SUMMARY - MEDICATIONS TO TAKE
I will START or STAY ON the medications listed below when I get home from the hospital:    ibuprofen 600 mg oral tablet  -- 1 tab(s) by mouth every 6 hours  -- Indication: For  delivery delivered    lanolin topical ointment  -- 1 Apply on skin to affected area every 6 hours As needed Sore Nipples  -- Indication: For  delivery delivered    Prenatal 1 oral capsule  -- 1 tab(s) by mouth once a day  -- Indication: For  delivery delivered    ferrous sulfate 325 mg (65 mg elemental iron) oral tablet  -- 1 tab(s) by mouth 3 times a day  -- Indication: For  delivery delivered

## 2023-04-26 NOTE — OB RN DELIVERY SUMMARY - NSSELHIDDEN_OBGYN_ALL_OB_FT
[NS_DeliveryAttending1_OBGYN_ALL_OB_FT:[id number]],[NS_DeliveryRN_OBGYN_ALL_OB_FT:[id number]==]

## 2023-04-26 NOTE — DISCHARGE NOTE OB - CARE PROVIDER_API CALL
Di Garcia)  Obstetrics and Gynecology  [address] [place] [address]  Phone: [phone]  Fax: [phone]  Follow Up Time:

## 2023-04-26 NOTE — CHART NOTE - NSCHARTNOTEFT_GEN_A_CORE
Called by RN regarding patient with low urine output of 20cc per hour.  Patient is s/p pLTCS for breech on POD#0.  QBL: 658.  Adequate based on weight is 27 per hour.  Patient had received 1.5L bolus this AM for low urine output.  Patient asymptomatic but was nauseous earlier.    T(C): 36.5 (26 Apr 2023 14:01), Max: 37.1 (25 Apr 2023 22:45)  T(F): 97.7 (26 Apr 2023 14:01), Max: 98.8 (25 Apr 2023 23:41)  HR: 71 (26 Apr 2023 11:00) (67 - 95)  BP: 107/71 (26 Apr 2023 11:00) (93/55 - 126/82)  BP(mean): 83 (26 Apr 2023 10:00) (62 - 92)                            10.9   11.49 )-----------( 340      ( 25 Apr 2023 22:50 )             34.3       Plan:  Zofran 4mg IVP PRN for nausea  Stat CBC/Coags  500cc LR bolus  Monitor intake/output    d/w Dr. Jones Cox PGY-1 aware of plan and will follow-up results

## 2023-04-26 NOTE — PROVIDER CONTACT NOTE (OTHER) - SITUATION
27yr old pt s/p primary  for breech presentation presents with low urine output from 6am.
Notify decreased otero urine output of 20ml/hr.

## 2023-04-26 NOTE — PROVIDER CONTACT NOTE (OTHER) - BACKGROUND
QBL 658cc. Intraop fluids 1000cc. No PMH/PSH. Uncomplicated antepartum/intrapartum course.
Primary C/S 4/26 @ 0053. Para 1001. .

## 2023-04-26 NOTE — CHART NOTE - NSCHARTNOTEFT_GEN_A_CORE
Late Entry Note - In a critical situation during time of evaluation    Patient was being monitored closely due to drop in H/H. Vital signs were stable and physical exam did not show an acute abdomen or signs of hematoma. Stat CT Scan was ordered and preliminary results show that there is no acute or active bleeding. Patient continues to remain stable for which Blood Transfusion was ordered. Will evaluate patient during the night for any changes in status. Results have been discussed with patient, all questions were answered and she refers understanding.

## 2023-04-26 NOTE — BRIEF OPERATIVE NOTE - OPERATION/FINDINGS
Viable female infant, wgt 2530 gms  delivered @ 00:53  breech presentation, clear copious fluid  loose nuchal x 2  hysterotomy closed in 2 layers  Surgicel placed over hysterotomy, bladder flap, rectus layer  grossly nl uterus, tubes & ovaries    QBL: 658  UOP: 50  IVF: 1000  Dictation Number: [id number]  04-26-23 @ 01:59

## 2023-04-26 NOTE — PROVIDER CONTACT NOTE (OTHER) - ACTION/TREATMENT ORDERED:
1LR IV bolus to be given as per order, encourage PO hydration at this time.
CARIDAD Escoto notified of the urine output and after consult with Dr Goldman, ordered a CBC and 500cc bolus. MD Anita escalante later called to do a bladder scan, bladder scan shows 8cc.

## 2023-04-26 NOTE — CHART NOTE - NSCHARTNOTEFT_GEN_A_CORE
Patient seen at bedside for decreased urine output and low H/H. See previous note by JAYSON Dunn NP. Patient has had UOP <30 cc/hr throughout the day. She is s/p 2L IVF bolus. CBC resulted with H/H 6.8/21.7.     Discussed CBC results with patient. Patient states she is slightly lightheaded and overall feeling "weak." Her bleeding is minimal. Abdominal pain is minimal.    ICU Vital Signs Last 24 Hrs  T(C): 36.5 (26 Apr 2023 14:01), Max: 37.1 (25 Apr 2023 22:45)  T(F): 97.7 (26 Apr 2023 14:01), Max: 98.8 (25 Apr 2023 23:41)  HR: 71 (26 Apr 2023 11:00) (67 - 95)  BP: 107/71 (26 Apr 2023 11:00) (93/55 - 126/82)  BP(mean): 83 (26 Apr 2023 10:00) (62 - 92)  ABP: --  ABP(mean): --  RR: 18 (26 Apr 2023 14:01) (7 - 21)  SpO2: 100% (26 Apr 2023 14:01) (98% - 100%)    O2 Parameters below as of 26 Apr 2023 14:01  Patient On (Oxygen Delivery Method): room air      PE:  Gen: Appears fatigued, NAD  RSP: Breathing comfortably on RA  Abd: Soft, appropriately tender, fundus firm and below umbilicus  : Minimal lochia                          6.8    14.85 )-----------( 264      ( 26 Apr 2023 16:15 )             21.7     A/P: POD#1 s/p pLTCS, QBL: 658 cc, with decreased urine output, patient found to have symptomatic acute blood loss anemia. Currently hemodynamically stable.  - Repeat CBC given large drop from prior (10.9/34.3->6.8/21.7)  - Discussed r/b/a of blood transfusion with patient. Agrees to proceed.  - If H/H confirmed, will transfuse 2u pRBC  - Encouraged PO hydration    D/w Dr. Jones Cox PGY1 Patient seen at bedside for decreased urine output and low H/H. See previous note by JAYSON Dunn NP. Patient has had UOP <30 cc/hr throughout the day. She is s/p 2L IVF bolus. CBC resulted with H/H 6.8/21.7.     Discussed CBC results with patient. Patient states she is slightly lightheaded and overall feeling "weak." Her bleeding is minimal. Abdominal pain is minimal.    ICU Vital Signs Last 24 Hrs  T(C): 36.5 (26 Apr 2023 14:01), Max: 37.1 (25 Apr 2023 22:45)  T(F): 97.7 (26 Apr 2023 14:01), Max: 98.8 (25 Apr 2023 23:41)  HR: 71 (26 Apr 2023 11:00) (67 - 95)  BP: 107/71 (26 Apr 2023 11:00) (93/55 - 126/82)  BP(mean): 83 (26 Apr 2023 10:00) (62 - 92)  ABP: --  ABP(mean): --  RR: 18 (26 Apr 2023 14:01) (7 - 21)  SpO2: 100% (26 Apr 2023 14:01) (98% - 100%)    O2 Parameters below as of 26 Apr 2023 14:01  Patient On (Oxygen Delivery Method): room air      PE:  Gen: Appears fatigued, NAD  RSP: Breathing comfortably on RA  Abd: Soft, appropriately tender, fundus firm and below umbilicus; pressure dressing removed, incision c/d/i with steri strips in place  : Minimal lochia                          6.8    14.85 )-----------( 264      ( 26 Apr 2023 16:15 )             21.7     A/P: POD#1 s/p pLTCS, QBL: 658 cc, with decreased urine output, patient found to have symptomatic acute blood loss anemia. Currently hemodynamically stable.  - Repeat CBC given large drop from prior (10.9/34.3->6.8/21.7)  - Discussed r/b/a of blood transfusion with patient. Agrees to proceed.  - If H/H confirmed, will transfuse 2u pRBC  - Encouraged PO hydration    D/w Dr. Jones Cox PGY1

## 2023-04-27 ENCOUNTER — APPOINTMENT (OUTPATIENT)
Dept: ANTEPARTUM | Facility: CLINIC | Age: 28
End: 2023-04-27

## 2023-04-27 LAB
ALBUMIN SERPL ELPH-MCNC: 2.7 G/DL — LOW (ref 3.3–5)
ALP SERPL-CCNC: 120 U/L — SIGNIFICANT CHANGE UP (ref 40–120)
ALT FLD-CCNC: 11 U/L — SIGNIFICANT CHANGE UP (ref 4–33)
ANION GAP SERPL CALC-SCNC: 13 MMOL/L — SIGNIFICANT CHANGE UP (ref 7–14)
APTT BLD: 29.6 SEC — SIGNIFICANT CHANGE UP (ref 27–36.3)
AST SERPL-CCNC: 24 U/L — SIGNIFICANT CHANGE UP (ref 4–32)
BASOPHILS # BLD AUTO: 0.01 K/UL — SIGNIFICANT CHANGE UP (ref 0–0.2)
BASOPHILS # BLD AUTO: 0.02 K/UL — SIGNIFICANT CHANGE UP (ref 0–0.2)
BASOPHILS NFR BLD AUTO: 0.1 % — SIGNIFICANT CHANGE UP (ref 0–2)
BASOPHILS NFR BLD AUTO: 0.1 % — SIGNIFICANT CHANGE UP (ref 0–2)
BILIRUB SERPL-MCNC: 0.7 MG/DL — SIGNIFICANT CHANGE UP (ref 0.2–1.2)
BUN SERPL-MCNC: 7 MG/DL — SIGNIFICANT CHANGE UP (ref 7–23)
CALCIUM SERPL-MCNC: 8.4 MG/DL — SIGNIFICANT CHANGE UP (ref 8.4–10.5)
CHLORIDE SERPL-SCNC: 104 MMOL/L — SIGNIFICANT CHANGE UP (ref 98–107)
CO2 SERPL-SCNC: 20 MMOL/L — LOW (ref 22–31)
CREAT SERPL-MCNC: 0.47 MG/DL — LOW (ref 0.5–1.3)
EGFR: 133 ML/MIN/1.73M2 — SIGNIFICANT CHANGE UP
EOSINOPHIL # BLD AUTO: 0.02 K/UL — SIGNIFICANT CHANGE UP (ref 0–0.5)
EOSINOPHIL # BLD AUTO: 0.03 K/UL — SIGNIFICANT CHANGE UP (ref 0–0.5)
EOSINOPHIL NFR BLD AUTO: 0.1 % — SIGNIFICANT CHANGE UP (ref 0–6)
EOSINOPHIL NFR BLD AUTO: 0.2 % — SIGNIFICANT CHANGE UP (ref 0–6)
FIBRINOGEN PPP-MCNC: 372 MG/DL — SIGNIFICANT CHANGE UP (ref 200–465)
GLUCOSE SERPL-MCNC: 112 MG/DL — HIGH (ref 70–99)
HCT VFR BLD CALC: 27.8 % — LOW (ref 34.5–45)
HCT VFR BLD CALC: 27.8 % — LOW (ref 34.5–45)
HGB BLD-MCNC: 8.9 G/DL — LOW (ref 11.5–15.5)
HGB BLD-MCNC: 9.2 G/DL — LOW (ref 11.5–15.5)
IANC: 10.53 K/UL — HIGH (ref 1.8–7.4)
IANC: 9.5 K/UL — HIGH (ref 1.8–7.4)
IMM GRANULOCYTES NFR BLD AUTO: 0.4 % — SIGNIFICANT CHANGE UP (ref 0–0.9)
IMM GRANULOCYTES NFR BLD AUTO: 0.6 % — SIGNIFICANT CHANGE UP (ref 0–0.9)
INR BLD: 0.99 RATIO — SIGNIFICANT CHANGE UP (ref 0.88–1.16)
LACTATE SERPL-SCNC: 1.9 MMOL/L — SIGNIFICANT CHANGE UP (ref 0.5–2)
LACTATE SERPL-SCNC: 3.1 MMOL/L — HIGH (ref 0.5–2)
LDH SERPL L TO P-CCNC: 242 U/L — HIGH (ref 135–225)
LYMPHOCYTES # BLD AUTO: 18.1 % — SIGNIFICANT CHANGE UP (ref 13–44)
LYMPHOCYTES # BLD AUTO: 2.56 K/UL — SIGNIFICANT CHANGE UP (ref 1–3.3)
LYMPHOCYTES # BLD AUTO: 22.2 % — SIGNIFICANT CHANGE UP (ref 13–44)
LYMPHOCYTES # BLD AUTO: 3 K/UL — SIGNIFICANT CHANGE UP (ref 1–3.3)
MCHC RBC-ENTMCNC: 25.6 PG — LOW (ref 27–34)
MCHC RBC-ENTMCNC: 25.8 PG — LOW (ref 27–34)
MCHC RBC-ENTMCNC: 32 GM/DL — SIGNIFICANT CHANGE UP (ref 32–36)
MCHC RBC-ENTMCNC: 33.1 GM/DL — SIGNIFICANT CHANGE UP (ref 32–36)
MCV RBC AUTO: 78.1 FL — LOW (ref 80–100)
MCV RBC AUTO: 79.9 FL — LOW (ref 80–100)
MONOCYTES # BLD AUTO: 0.9 K/UL — SIGNIFICANT CHANGE UP (ref 0–0.9)
MONOCYTES # BLD AUTO: 0.97 K/UL — HIGH (ref 0–0.9)
MONOCYTES NFR BLD AUTO: 6.7 % — SIGNIFICANT CHANGE UP (ref 2–14)
MONOCYTES NFR BLD AUTO: 6.8 % — SIGNIFICANT CHANGE UP (ref 2–14)
NEUTROPHILS # BLD AUTO: 10.53 K/UL — HIGH (ref 1.8–7.4)
NEUTROPHILS # BLD AUTO: 9.5 K/UL — HIGH (ref 1.8–7.4)
NEUTROPHILS NFR BLD AUTO: 70.4 % — SIGNIFICANT CHANGE UP (ref 43–77)
NEUTROPHILS NFR BLD AUTO: 74.3 % — SIGNIFICANT CHANGE UP (ref 43–77)
NRBC # BLD: 0 /100 WBCS — SIGNIFICANT CHANGE UP (ref 0–0)
NRBC # BLD: 0 /100 WBCS — SIGNIFICANT CHANGE UP (ref 0–0)
NRBC # FLD: 0 K/UL — SIGNIFICANT CHANGE UP (ref 0–0)
NRBC # FLD: 0 K/UL — SIGNIFICANT CHANGE UP (ref 0–0)
PLATELET # BLD AUTO: 254 K/UL — SIGNIFICANT CHANGE UP (ref 150–400)
PLATELET # BLD AUTO: 264 K/UL — SIGNIFICANT CHANGE UP (ref 150–400)
POTASSIUM SERPL-MCNC: 3.8 MMOL/L — SIGNIFICANT CHANGE UP (ref 3.5–5.3)
POTASSIUM SERPL-SCNC: 3.8 MMOL/L — SIGNIFICANT CHANGE UP (ref 3.5–5.3)
PROT SERPL-MCNC: 5.2 G/DL — LOW (ref 6–8.3)
PROTHROM AB SERPL-ACNC: 11.5 SEC — SIGNIFICANT CHANGE UP (ref 10.5–13.4)
RBC # BLD: 3.48 M/UL — LOW (ref 3.8–5.2)
RBC # BLD: 3.56 M/UL — LOW (ref 3.8–5.2)
RBC # FLD: 14.1 % — SIGNIFICANT CHANGE UP (ref 10.3–14.5)
RBC # FLD: 14.2 % — SIGNIFICANT CHANGE UP (ref 10.3–14.5)
SODIUM SERPL-SCNC: 137 MMOL/L — SIGNIFICANT CHANGE UP (ref 135–145)
URATE SERPL-MCNC: 4.2 MG/DL — SIGNIFICANT CHANGE UP (ref 2.5–7)
WBC # BLD: 13.5 K/UL — HIGH (ref 3.8–10.5)
WBC # BLD: 14.17 K/UL — HIGH (ref 3.8–10.5)
WBC # FLD AUTO: 13.5 K/UL — HIGH (ref 3.8–10.5)
WBC # FLD AUTO: 14.17 K/UL — HIGH (ref 3.8–10.5)

## 2023-04-27 RX ORDER — IBUPROFEN 200 MG
600 TABLET ORAL EVERY 6 HOURS
Refills: 0 | Status: DISCONTINUED | OUTPATIENT
Start: 2023-04-27 | End: 2023-04-30

## 2023-04-27 RX ORDER — FERROUS SULFATE 325(65) MG
325 TABLET ORAL THREE TIMES A DAY
Refills: 0 | Status: DISCONTINUED | OUTPATIENT
Start: 2023-04-27 | End: 2023-04-30

## 2023-04-27 RX ORDER — ASCORBIC ACID 60 MG
500 TABLET,CHEWABLE ORAL DAILY
Refills: 0 | Status: DISCONTINUED | OUTPATIENT
Start: 2023-04-27 | End: 2023-04-30

## 2023-04-27 RX ADMIN — Medication 600 MILLIGRAM(S): at 09:24

## 2023-04-27 RX ADMIN — OXYCODONE HYDROCHLORIDE 5 MILLIGRAM(S): 5 TABLET ORAL at 10:39

## 2023-04-27 RX ADMIN — Medication 600 MILLIGRAM(S): at 02:55

## 2023-04-27 RX ADMIN — Medication 600 MILLIGRAM(S): at 18:05

## 2023-04-27 RX ADMIN — Medication 325 MILLIGRAM(S): at 21:24

## 2023-04-27 RX ADMIN — Medication 600 MILLIGRAM(S): at 10:01

## 2023-04-27 RX ADMIN — Medication 600 MILLIGRAM(S): at 03:30

## 2023-04-27 RX ADMIN — OXYCODONE HYDROCHLORIDE 5 MILLIGRAM(S): 5 TABLET ORAL at 11:09

## 2023-04-27 RX ADMIN — Medication 600 MILLIGRAM(S): at 17:35

## 2023-04-27 RX ADMIN — Medication 325 MILLIGRAM(S): at 05:51

## 2023-04-27 RX ADMIN — ONDANSETRON 4 MILLIGRAM(S): 8 TABLET, FILM COATED ORAL at 11:46

## 2023-04-27 NOTE — PROGRESS NOTE ADULT - ASSESSMENT
28y/o  POD#1 from pLTCS for breech presentation in labor, . Course c/b oliguria and dizziness on POD#0, found to have acute blood loss anemia now sp 2u pRBC (). H/H 9.2/27.8. Overall pt recovering well post-operatively with improvement in dizziness and oliguria this AM.    #Acute blood loss anemia likely 2/2 surgery  - s/p 2uPRBC ()  - Hct 32.2->34.3->21.7->19.4->2u pRBC->27.8  - F/u AM CBC  - Lactate 3.1, continue to trend  - CT A/P ()-p: no GI or intraabdominal bleed, post-surgical changes. F/u final read    #Oliguria  - UOP overnight adequate, 1750cc  - D/c Lr this AM pending AM CBC    #Post-operative state  - Continue with po analgesia  - Increase ambulation  - Continue regular diet  - Incision dressing removed    Jen Jeff, PGY1 28y/o  POD#1 from pLTCS for breech presentation in labor, . Course c/b oliguria and dizziness on POD#0, found to have acute blood loss anemia now sp 2u pRBC (). H/H 9.2/27.8. Overall pt recovering well post-operatively with improvement in dizziness and oliguria this AM.    #Acute blood loss anemia likely 2/2 surgery  - s/p 2uPRBC ()  - Hct 32.2->34.3->21.7->19.4->2u pRBC->27.8  - F/u AM CBC  - Lactate 3.1, continue to trend  - CT A/P ()-p: no GI or intraabdominal bleed, post-surgical changes. F/u final read  - VSS overnight, pt denies dizziness this AM    #Oliguria, resolved  - UOP overnight adequate, 1750cc  - D/c Lr this AM pending AM CBC    #Post-operative state  - Continue with po analgesia  - Increase ambulation  - Continue regular diet  - Incision dressing removed    Jen Jeff, PGY1

## 2023-04-27 NOTE — CHART NOTE - NSCHARTNOTEFT_GEN_A_CORE
Patient evaluated at bedside and found stable. Vital signs unremarkable. No acute abdomen findings on PE. Will continue to monitor. Currently receiving BT.

## 2023-04-28 RX ORDER — FAMOTIDINE 10 MG/ML
20 INJECTION INTRAVENOUS DAILY
Refills: 0 | Status: DISCONTINUED | OUTPATIENT
Start: 2023-04-28 | End: 2023-04-30

## 2023-04-28 RX ORDER — SENNA PLUS 8.6 MG/1
2 TABLET ORAL AT BEDTIME
Refills: 0 | Status: DISCONTINUED | OUTPATIENT
Start: 2023-04-28 | End: 2023-04-30

## 2023-04-28 RX ADMIN — Medication 600 MILLIGRAM(S): at 00:12

## 2023-04-28 RX ADMIN — Medication 500 MILLIGRAM(S): at 13:18

## 2023-04-28 RX ADMIN — Medication 325 MILLIGRAM(S): at 13:18

## 2023-04-28 RX ADMIN — Medication 600 MILLIGRAM(S): at 18:10

## 2023-04-28 RX ADMIN — Medication 600 MILLIGRAM(S): at 01:00

## 2023-04-28 RX ADMIN — Medication 600 MILLIGRAM(S): at 18:47

## 2023-04-28 RX ADMIN — Medication 600 MILLIGRAM(S): at 13:18

## 2023-04-28 RX ADMIN — FAMOTIDINE 20 MILLIGRAM(S): 10 INJECTION INTRAVENOUS at 13:55

## 2023-04-28 RX ADMIN — Medication 600 MILLIGRAM(S): at 14:00

## 2023-04-28 RX ADMIN — Medication 325 MILLIGRAM(S): at 22:37

## 2023-04-28 RX ADMIN — Medication 1 TABLET(S): at 13:18

## 2023-04-28 NOTE — PROGRESS NOTE ADULT - ASSESSMENT
A/P: 26yo POD#2 s/p pLTCS for breech presentation in labor, . Course c/b oliguria and dizziness on POD#0 which has since resolved. Course also c/b by acute blood loss anemia; now s/p 2u pRBC (). H/H 9.2/27.8. Overall pt recovering well post-operatively and denying in dizziness this AM.    #Acute blood loss anemia  - s/p 2uPRBC ()  - Hct 32.2->34.3->21.7->19.4->2u pRBC->27.8  - Lactate 3.1->1.9  - CT A/P ()-Postpartum uterus with  defect. No hematoma or active bleeding. Postoperative fluid and gas spanning 7 cm at the bladder flap; appearance of mottled gas may be due to surgical material/Surgicel.  - VSS overnight    #Routine Postpartum Care  - Continue with PO pain management  - Increase ambulation  - Continue regular diet  - DVT prophylaxis with Heparin       Gely Kirby, PGY-1

## 2023-04-28 NOTE — PROGRESS NOTE ADULT - ATTENDING COMMENTS
Patient seen at bedside   Reports incisional pain   denies any headaches, lightheadedness or dizziness   Po intake   hydration   pain control as needed   ambulation   DC home tomorrow if remains stable

## 2023-04-29 RX ADMIN — Medication 600 MILLIGRAM(S): at 06:20

## 2023-04-29 RX ADMIN — Medication 1 TABLET(S): at 12:20

## 2023-04-29 RX ADMIN — Medication 600 MILLIGRAM(S): at 13:00

## 2023-04-29 RX ADMIN — Medication 600 MILLIGRAM(S): at 00:45

## 2023-04-29 RX ADMIN — Medication 600 MILLIGRAM(S): at 17:53

## 2023-04-29 RX ADMIN — FAMOTIDINE 20 MILLIGRAM(S): 10 INJECTION INTRAVENOUS at 12:20

## 2023-04-29 RX ADMIN — SENNA PLUS 2 TABLET(S): 8.6 TABLET ORAL at 00:14

## 2023-04-29 RX ADMIN — Medication 325 MILLIGRAM(S): at 12:20

## 2023-04-29 RX ADMIN — Medication 500 MILLIGRAM(S): at 14:05

## 2023-04-29 RX ADMIN — Medication 325 MILLIGRAM(S): at 21:40

## 2023-04-29 RX ADMIN — Medication 600 MILLIGRAM(S): at 00:16

## 2023-04-29 RX ADMIN — SENNA PLUS 2 TABLET(S): 8.6 TABLET ORAL at 21:40

## 2023-04-29 RX ADMIN — Medication 600 MILLIGRAM(S): at 05:48

## 2023-04-29 RX ADMIN — Medication 600 MILLIGRAM(S): at 12:20

## 2023-04-29 RX ADMIN — Medication 600 MILLIGRAM(S): at 17:35

## 2023-04-29 NOTE — PROGRESS NOTE ADULT - ASSESSMENT
Assessment and Plan:   · Assessment	  A/P: 28yo POD#3 s/p pLTCS for breech presentation in labor, . Course c/b oliguria and dizziness on POD#0 which has since resolved. Course also c/b by acute blood loss anemia; now s/p 2u pRBC (). H/H 9.2/27.8. Overall pt recovering well post-operatively and denying in dizziness this AM.    #Acute blood loss anemia  - s/p 2uPRBC ()  - Hct 32.2->34.3->21.7->19.4->2u pRBC->27.8  - Lactate 3.1->1.9  - CT A/P ()-Postpartum uterus with  defect. No hematoma or active bleeding. Postoperative fluid and gas spanning 7 cm at the bladder flap; appearance of mottled gas may be due to surgical material/Surgicel.  - VSS overnight    Her pain is well controlled.   She is tolerating a regular diet and passing flatus.   Denies N/V. Denies CP/SOB/lightheadedness/dizziness.   She is ambulating without difficulty.   Voiding spontaneously.    Patient is stable and doing well post-operatively.    - Continue regular diet.  - Increase ambulation.  - Continue motrin, tylenol, oxycodone PRN for pain control.   -Encourage breastfeeding.  -Incisional care and PO instructions reviewed.     Discharge planning--to be discharged tomorrow, 23    Follow up @ [facility] in 2 weeks for incision check visit  [phone]    Discussed with MD Chaim Santiago

## 2023-04-30 VITALS
SYSTOLIC BLOOD PRESSURE: 125 MMHG | DIASTOLIC BLOOD PRESSURE: 81 MMHG | RESPIRATION RATE: 18 BRPM | OXYGEN SATURATION: 100 % | TEMPERATURE: 98 F | HEART RATE: 64 BPM

## 2023-04-30 RX ORDER — FERROUS SULFATE 325(65) MG
1 TABLET ORAL
Qty: 0 | Refills: 0 | DISCHARGE
Start: 2023-04-30

## 2023-04-30 RX ORDER — IBUPROFEN 200 MG
1 TABLET ORAL
Qty: 0 | Refills: 0 | DISCHARGE
Start: 2023-04-30

## 2023-04-30 RX ORDER — LANOLIN
1 OINTMENT (GRAM) TOPICAL
Qty: 0 | Refills: 0 | DISCHARGE
Start: 2023-04-30

## 2023-04-30 RX ORDER — CHOLECALCIFEROL (VITAMIN D3) 125 MCG
1 CAPSULE ORAL
Refills: 0 | DISCHARGE

## 2023-04-30 RX ADMIN — Medication 600 MILLIGRAM(S): at 05:38

## 2023-04-30 RX ADMIN — Medication 600 MILLIGRAM(S): at 00:17

## 2023-04-30 RX ADMIN — Medication 325 MILLIGRAM(S): at 05:39

## 2023-04-30 RX ADMIN — FAMOTIDINE 20 MILLIGRAM(S): 10 INJECTION INTRAVENOUS at 12:46

## 2023-04-30 RX ADMIN — Medication 600 MILLIGRAM(S): at 13:30

## 2023-04-30 RX ADMIN — Medication 600 MILLIGRAM(S): at 00:47

## 2023-04-30 RX ADMIN — Medication 1 TABLET(S): at 12:45

## 2023-04-30 RX ADMIN — Medication 600 MILLIGRAM(S): at 06:08

## 2023-04-30 RX ADMIN — Medication 600 MILLIGRAM(S): at 12:46

## 2023-04-30 RX ADMIN — Medication 500 MILLIGRAM(S): at 12:45

## 2023-04-30 NOTE — PROGRESS NOTE ADULT - ASSESSMENT
A/P: 26yo POD#4 s/p pLTCS for breech presentation in labor, . Course c/b by acute blood loss anemia; now s/p 2u pRBC () with appropriate rise in H/H. Overall pt recovering well post-operatively.    #Acute blood loss anemia  - s/p 2uPRBC ()  - Hct 32.2->34.3->21.7->19.4->2u pRBC->27.8  - Lactate 3.1->1.9  - CT A/P ()-Postpartum uterus with  defect. No hematoma or active bleeding. Postoperative fluid and gas spanning 7 cm at the bladder flap; appearance of mottled gas may be due to surgical material/Surgicel.  - VSS overnight    #Routine Postpartum Care  - Continue with PO pain management  - Increase ambulation  - Continue regular diet  - DVT prophylaxis with Heparin       Sinks PGY1

## 2023-04-30 NOTE — PROGRESS NOTE ADULT - SUBJECTIVE AND OBJECTIVE BOX
OB Progress Note: LTCS, POD#2    S: 26yo POD#2 s/p LTCS. Pain is well controlled. She is tolerating a regular diet and passing flatus. She is voiding spontaneously, and ambulating without difficulty. Endorses light vaginal bleeding, soaking <1 pad/hr. Denies CP/SOB. Denies lightheadedness/dizziness. Denies N/V.    O:  Vitals:  Vital Signs Last 24 Hrs  T(C): 36.6 (28 Apr 2023 06:36), Max: 37.1 (27 Apr 2023 22:05)  T(F): 97.8 (28 Apr 2023 06:36), Max: 98.8 (27 Apr 2023 22:05)  HR: 74 (28 Apr 2023 06:36) (74 - 92)  BP: 116/78 (28 Apr 2023 06:36) (112/68 - 120/82)  BP(mean): --  RR: 17 (28 Apr 2023 06:36) (17 - 19)  SpO2: 98% (28 Apr 2023 06:36) (98% - 100%)    Parameters below as of 28 Apr 2023 06:36  Patient On (Oxygen Delivery Method): room air        PE:  General: NAD  Heart: extremities well-perfused  Lungs: breathing normally  Abdomen: Soft, appropriately tender, incision c/d/i,   Extremities: No erythema, no pitting edema    MEDICATIONS  (STANDING):  acetaminophen     Tablet .. 975 milliGRAM(s) Oral <User Schedule>  ascorbic acid 500 milliGRAM(s) Oral daily  diphtheria/tetanus/pertussis (acellular) Vaccine (Adacel) 0.5 milliLiter(s) IntraMuscular once  ferrous    sulfate 325 milliGRAM(s) Oral three times a day  heparin   Injectable 5000 Unit(s) SubCutaneous every 12 hours  ibuprofen  Tablet. 600 milliGRAM(s) Oral every 6 hours  lactated ringers Bolus 500 milliLiter(s) IV Bolus once  prenatal multivitamin 1 Tablet(s) Oral daily      MEDICATIONS  (PRN):  diphenhydrAMINE 25 milliGRAM(s) Oral every 6 hours PRN Pruritus  diphenhydrAMINE 25 milliGRAM(s) Oral every 4 hours PRN Pruritus  lanolin Ointment 1 Application(s) Topical every 6 hours PRN Sore Nipples  magnesium hydroxide Suspension 30 milliLiter(s) Oral two times a day PRN Constipation  naloxone Injectable 0.1 milliGRAM(s) IV Push every 3 minutes PRN For ANY of the following changes in patient status:  A. Breaths Per Minute LESS THAN 10, B. Oxygen saturation LESS THAN 90%, C. Sedation score of 6 for Stop After: 4 Times  oxyCODONE    IR 5 milliGRAM(s) Oral once PRN Moderate to Severe Pain (4-10)  oxyCODONE    IR 5 milliGRAM(s) Oral every 3 hours PRN Moderate to Severe Pain (4-10)  simethicone 80 milliGRAM(s) Chew every 4 hours PRN Gas      Labs:  Blood type: B Positive  Rubella IgG: RPR: Negative                          8.9<L>   13.50<H> >-----------< 264    ( 04-27 @ 06:00 )             27.8<L>                        9.2<L>   14.17<H> >-----------< 254    ( 04-27 @ 02:06 )             27.8<L>                        6.1<LL>   13.96<H> >-----------< 256    ( 04-26 @ 17:17 )             19.4<LL>                        6.8<LL>   14.85<H> >-----------< 264    ( 04-26 @ 16:15 )             21.7<L>                        10.9<L>   11.49<H> >-----------< 340    ( 04-25 @ 22:50 )             34.3<L>    04-27-23 @ 02:06      137  |  104  |  7   ----------------------------<  112<H>  3.8   |  20<L>  |  0.47<L>        Ca    8.4      27 Apr 2023 02:06    TPro  5.2<L>  /  Alb  2.7<L>  /  TBili  0.7  /  DBili  x   /  AST  24  /  ALT  11  /  AlkPhos  120  04-27-23 @ 02:06    
Post-Operative Note, C/S  She is a  27y woman who is now post-operative day: 3    Subjective:  The patient feels well.  She is ambulating.   She is tolerating regular diet.  She denies nausea and vomiting; denies fever.  She is voiding.  Her pain is controlled; incisional pain is appropriate.  She reports normal postpartum bleeding.  She is breastfeeding.  She is formula feeding.    Physical exam:    Vital Signs Last 24 Hrs  T(C): 36.4 (29 Apr 2023 09:44), Max: 36.7 (28 Apr 2023 14:43)  T(F): 97.6 (29 Apr 2023 09:44), Max: 98 (28 Apr 2023 14:43)  HR: 66 (29 Apr 2023 09:44) (60 - 71)  BP: 116/74 (29 Apr 2023 09:44) (107/74 - 131/82)  BP(mean): --  RR: 18 (29 Apr 2023 09:44) (17 - 19)  SpO2: 100% (29 Apr 2023 09:44) (100% - 100%)    Parameters below as of 29 Apr 2023 05:59  Patient On (Oxygen Delivery Method): room air        Gen: NAD  Breast: Soft, nontender, not engorged.  Abdomen: Soft, nontender, no distension , firm uterine fundus at umbilicus.  Incision: C/D/I.  Pelvic: Normal lochia noted  Ext: No calf tenderness    LABS:      Rubella status:     Allergies    No Known Allergies    Intolerances      MEDICATIONS  (STANDING):  acetaminophen     Tablet .. 975 milliGRAM(s) Oral <User Schedule>  ascorbic acid 500 milliGRAM(s) Oral daily  diphtheria/tetanus/pertussis (acellular) Vaccine (Adacel) 0.5 milliLiter(s) IntraMuscular once  famotidine    Tablet 20 milliGRAM(s) Oral daily  ferrous    sulfate 325 milliGRAM(s) Oral three times a day  heparin   Injectable 5000 Unit(s) SubCutaneous every 12 hours  ibuprofen  Tablet. 600 milliGRAM(s) Oral every 6 hours  prenatal multivitamin 1 Tablet(s) Oral daily  senna 2 Tablet(s) Oral at bedtime    MEDICATIONS  (PRN):  diphenhydrAMINE 25 milliGRAM(s) Oral every 6 hours PRN Pruritus  lanolin Ointment 1 Application(s) Topical every 6 hours PRN Sore Nipples  magnesium hydroxide Suspension 30 milliLiter(s) Oral two times a day PRN Constipation  oxyCODONE    IR 5 milliGRAM(s) Oral once PRN Moderate to Severe Pain (4-10)  oxyCODONE    IR 5 milliGRAM(s) Oral every 3 hours PRN Moderate to Severe Pain (4-10)  simethicone 80 milliGRAM(s) Chew every 4 hours PRN Gas            
OB Progress Note:  Delivery, POD#4    S: Patient feels well. Pain is well controlled. She is tolerating a regular diet and passing flatus. She is voiding spontaneously and ambulating without difficulty. Endorses light vaginal bleeding, soaking < 1 pad/hour. Denies CP/SOB, lightheadedness/dizziness, N/V.    MEDICATIONS  (STANDING):  acetaminophen     Tablet .. 975 milliGRAM(s) Oral <User Schedule>  ascorbic acid 500 milliGRAM(s) Oral daily  diphtheria/tetanus/pertussis (acellular) Vaccine (Adacel) 0.5 milliLiter(s) IntraMuscular once  famotidine    Tablet 20 milliGRAM(s) Oral daily  ferrous    sulfate 325 milliGRAM(s) Oral three times a day  heparin   Injectable 5000 Unit(s) SubCutaneous every 12 hours  ibuprofen  Tablet. 600 milliGRAM(s) Oral every 6 hours  prenatal multivitamin 1 Tablet(s) Oral daily  senna 2 Tablet(s) Oral at bedtime      MEDICATIONS  (PRN):  diphenhydrAMINE 25 milliGRAM(s) Oral every 6 hours PRN Pruritus  lanolin Ointment 1 Application(s) Topical every 6 hours PRN Sore Nipples  magnesium hydroxide Suspension 30 milliLiter(s) Oral two times a day PRN Constipation  oxyCODONE    IR 5 milliGRAM(s) Oral once PRN Moderate to Severe Pain (4-10)  oxyCODONE    IR 5 milliGRAM(s) Oral every 3 hours PRN Moderate to Severe Pain (4-10)  simethicone 80 milliGRAM(s) Chew every 4 hours PRN Gas      O:  Vitals:  Vital Signs Last 24 Hrs  T(C): 36.7 (2023 05:37), Max: 36.7 (2023 17:45)  T(F): 98.1 (2023 05:37), Max: 98.1 (2023 17:45)  HR: 60 (2023 05:37) (60 - 88)  BP: 126/83 (2023 05:37) (116/74 - 126/83)  BP(mean): --  RR: 17 (2023 05:37) (17 - 18)  SpO2: 99% (2023 05:37) (99% - 100%)    Parameters below as of 2023 05:37  Patient On (Oxygen Delivery Method): room air        Labs:  Blood type: B Positive  Rubella IgG: RPR: Negative        Physical Exam:  General: NAD  Heart: Clinically well-perfused  Lungs: Breathing comfortably on room air  Abdomen: Soft, non-distended, appropriately tender, fundus firm, incision c/d/i w/ steri strips in place  Extremities: No calf edema/tenderness
POST OP DAY  1  s/p   SECTION    SUBJECTIVE:    PAIN SCALE SCORE: [x] Refer to charted pain scores    THERAPY:  [ X ] Spinal morphine   [  ] Epidural morphine   [  ] IV PCA Hydromorphone 1 mg/ml    OBJECTIVE:     SEDATION SCORE:	  [ x ] Alert	    [  ] Drowsy        [  ] Arousable	[  ] Asleep	[  ] Unresponsive    Side Effects:	  [ x ] None	     [  ] Nausea        [  ] Pruritus        [  ] Weakness   [  ] Numbness        ASSESSMENT/ PLAN   [   ] Discontinue         [  ] Continue    [ x ] Change to prn Analgesics as per primary service.    DOCUMENTATION & VERIFICATION OF CURRENT MEDS [ x ] Done    COMMENTS: No Headache. No apparent anesthesia complications.  
R1 Progress Note    Patient seen and examined at bedside. Overnight pt with decreased UOP and dizziness, s/p 2u pRBC for acute blood loss anemia. CT A/P preliminary read with no GI or intra-abdominal bleed. No acute complaints, pain well controlled. Patient is ambulating and tolerating regular diet. Has not yet passed flatus. Lr is still in place. Denies CP, SOB, N/V, HA, blurred vision, epigastric pain. Denies dizziness overnight.    Vital Signs Last 24 Hours  T(C): 36.4 (04-27-23 @ 06:37), Max: 36.8 (04-27-23 @ 01:30)  HR: 80 (04-27-23 @ 06:37) (71 - 99)  BP: 107/69 (04-27-23 @ 06:37) (96/68 - 126/82)  RR: 18 (04-27-23 @ 06:37) (15 - 20)  SpO2: 99% (04-27-23 @ 06:37) (99% - 100%)    I&O's Summary    26 Apr 2023 07:01  -  27 Apr 2023 07:00  --------------------------------------------------------  IN: 1000 mL / OUT: 2125 mL / NET: -1125 mL        Physical Exam:  General: NAD  Abdomen: Soft, non-tender, non-distended, fundus firm  Incision: Pfannenstiel incision CDI, subcuticular suture closure  Pelvic: Lochia wnl    Labs:    Blood Type: B Positive  Antibody Screen: Negative  RPR: Negative               9.2    14.17 )-----------( 254      ( 04-27 @ 02:06 )             27.8                6.1    13.96 )-----------( 256      ( 04-26 @ 17:17 )             19.4                6.8    14.85 )-----------( 264      ( 04-26 @ 16:15 )             21.7         MEDICATIONS  (STANDING):  acetaminophen     Tablet .. 975 milliGRAM(s) Oral <User Schedule>  ascorbic acid 500 milliGRAM(s) Oral daily  diphtheria/tetanus/pertussis (acellular) Vaccine (Adacel) 0.5 milliLiter(s) IntraMuscular once  ferrous    sulfate 325 milliGRAM(s) Oral three times a day  heparin   Injectable 5000 Unit(s) SubCutaneous every 12 hours  ibuprofen  Tablet. 600 milliGRAM(s) Oral every 6 hours  lactated ringers Bolus 500 milliLiter(s) IV Bolus once  prenatal multivitamin 1 Tablet(s) Oral daily    MEDICATIONS  (PRN):  dexAMETHasone  Injectable 4 milliGRAM(s) IV Push every 6 hours PRN Nausea  diphenhydrAMINE 25 milliGRAM(s) Oral every 6 hours PRN Pruritus  diphenhydrAMINE 25 milliGRAM(s) Oral every 4 hours PRN Pruritus  lanolin Ointment 1 Application(s) Topical every 6 hours PRN Sore Nipples  magnesium hydroxide Suspension 30 milliLiter(s) Oral two times a day PRN Constipation  naloxone Injectable 0.1 milliGRAM(s) IV Push every 3 minutes PRN For ANY of the following changes in patient status:  A. Breaths Per Minute LESS THAN 10, B. Oxygen saturation LESS THAN 90%, C. Sedation score of 6 for Stop After: 4 Times  ondansetron Injectable 4 milliGRAM(s) IV Push every 6 hours PRN Nausea  oxyCODONE    IR 5 milliGRAM(s) Oral every 3 hours PRN Mild Pain (1 - 3)  oxyCODONE    IR 5 milliGRAM(s) Oral once PRN Moderate to Severe Pain (4-10)  oxyCODONE    IR 10 milliGRAM(s) Oral every 3 hours PRN Moderate Pain (4 - 6)  oxyCODONE    IR 5 milliGRAM(s) Oral every 3 hours PRN Moderate to Severe Pain (4-10)  simethicone 80 milliGRAM(s) Chew every 4 hours PRN Gas

## 2023-05-04 ENCOUNTER — APPOINTMENT (OUTPATIENT)
Dept: ANTEPARTUM | Facility: CLINIC | Age: 28
End: 2023-05-04

## 2023-05-16 NOTE — OB PROVIDER H&P - NSICDXNOPASTSURGICALHX_GEN_ALL_CORE
Ongoing SW/CM Assessment/Plan of Care Note     See SW/CM flowsheets for goals and other objective data.    Progress note:  MSW met with pt. Pt reports feeling better. Pt reports he is no longer hearing voices. Pt denying SI/HI. Pt will have a family meeting with his wife tomorrow at 9AM. MSW placed referral for appt with Dr. Irwin. She is willing to see the pt.     CM/SW team to continue to follow for discharge needs.         <-- Click to add NO significant Past Surgical History

## 2023-08-11 ENCOUNTER — EMERGENCY (EMERGENCY)
Facility: HOSPITAL | Age: 28
LOS: 1 days | Discharge: HOME | End: 2023-08-11
Attending: EMERGENCY MEDICINE | Admitting: EMERGENCY MEDICINE
Payer: MEDICAID

## 2023-08-11 VITALS
RESPIRATION RATE: 16 BRPM | HEART RATE: 105 BPM | TEMPERATURE: 100 F | SYSTOLIC BLOOD PRESSURE: 112 MMHG | DIASTOLIC BLOOD PRESSURE: 88 MMHG | OXYGEN SATURATION: 100 %

## 2023-08-11 VITALS — TEMPERATURE: 100 F

## 2023-08-11 DIAGNOSIS — Z98.891 HISTORY OF UTERINE SCAR FROM PREVIOUS SURGERY: Chronic | ICD-10-CM

## 2023-08-11 LAB
ALBUMIN SERPL ELPH-MCNC: 4 G/DL — SIGNIFICANT CHANGE UP (ref 3.3–5)
ALP SERPL-CCNC: 45 U/L — SIGNIFICANT CHANGE UP (ref 40–120)
ALT FLD-CCNC: 30 U/L — SIGNIFICANT CHANGE UP (ref 4–33)
ANION GAP SERPL CALC-SCNC: 12 MMOL/L — SIGNIFICANT CHANGE UP (ref 7–14)
APPEARANCE UR: ABNORMAL
AST SERPL-CCNC: 28 U/L — SIGNIFICANT CHANGE UP (ref 4–32)
BACTERIA # UR AUTO: ABNORMAL /HPF
BASOPHILS # BLD AUTO: 0.01 K/UL — SIGNIFICANT CHANGE UP (ref 0–0.2)
BASOPHILS NFR BLD AUTO: 0.1 % — SIGNIFICANT CHANGE UP (ref 0–2)
BILIRUB SERPL-MCNC: 0.5 MG/DL — SIGNIFICANT CHANGE UP (ref 0.2–1.2)
BILIRUB UR-MCNC: ABNORMAL
BLD GP AB SCN SERPL QL: NEGATIVE — SIGNIFICANT CHANGE UP
BUN SERPL-MCNC: 5 MG/DL — LOW (ref 7–23)
CALCIUM SERPL-MCNC: 9.3 MG/DL — SIGNIFICANT CHANGE UP (ref 8.4–10.5)
CHLORIDE SERPL-SCNC: 104 MMOL/L — SIGNIFICANT CHANGE UP (ref 98–107)
CO2 SERPL-SCNC: 22 MMOL/L — SIGNIFICANT CHANGE UP (ref 22–31)
COLOR SPEC: ABNORMAL
CREAT SERPL-MCNC: 0.54 MG/DL — SIGNIFICANT CHANGE UP (ref 0.5–1.3)
DIFF PNL FLD: ABNORMAL
EGFR: 129 ML/MIN/1.73M2 — SIGNIFICANT CHANGE UP
EOSINOPHIL # BLD AUTO: 0.01 K/UL — SIGNIFICANT CHANGE UP (ref 0–0.5)
EOSINOPHIL NFR BLD AUTO: 0.1 % — SIGNIFICANT CHANGE UP (ref 0–6)
GLUCOSE SERPL-MCNC: 90 MG/DL — SIGNIFICANT CHANGE UP (ref 70–99)
GLUCOSE UR QL: 100 MG/DL
HCG SERPL-ACNC: SIGNIFICANT CHANGE UP MIU/ML
HCT VFR BLD CALC: 34 % — LOW (ref 34.5–45)
HGB BLD-MCNC: 11 G/DL — LOW (ref 11.5–15.5)
IANC: 6.91 K/UL — SIGNIFICANT CHANGE UP (ref 1.8–7.4)
IMM GRANULOCYTES NFR BLD AUTO: 0.2 % — SIGNIFICANT CHANGE UP (ref 0–0.9)
KETONES UR-MCNC: NEGATIVE MG/DL — SIGNIFICANT CHANGE UP
LEUKOCYTE ESTERASE UR-ACNC: ABNORMAL
LYMPHOCYTES # BLD AUTO: 1.44 K/UL — SIGNIFICANT CHANGE UP (ref 1–3.3)
LYMPHOCYTES # BLD AUTO: 16.3 % — SIGNIFICANT CHANGE UP (ref 13–44)
MCHC RBC-ENTMCNC: 25.5 PG — LOW (ref 27–34)
MCHC RBC-ENTMCNC: 32.4 GM/DL — SIGNIFICANT CHANGE UP (ref 32–36)
MCV RBC AUTO: 78.7 FL — LOW (ref 80–100)
MONOCYTES # BLD AUTO: 0.44 K/UL — SIGNIFICANT CHANGE UP (ref 0–0.9)
MONOCYTES NFR BLD AUTO: 5 % — SIGNIFICANT CHANGE UP (ref 2–14)
NEUTROPHILS # BLD AUTO: 6.91 K/UL — SIGNIFICANT CHANGE UP (ref 1.8–7.4)
NEUTROPHILS NFR BLD AUTO: 78.3 % — HIGH (ref 43–77)
NITRITE UR-MCNC: NEGATIVE — SIGNIFICANT CHANGE UP
NRBC # BLD: 0 /100 WBCS — SIGNIFICANT CHANGE UP (ref 0–0)
NRBC # FLD: 0 K/UL — SIGNIFICANT CHANGE UP (ref 0–0)
PH UR: 7 — SIGNIFICANT CHANGE UP (ref 5–8)
PLATELET # BLD AUTO: 314 K/UL — SIGNIFICANT CHANGE UP (ref 150–400)
POTASSIUM SERPL-MCNC: 3.4 MMOL/L — LOW (ref 3.5–5.3)
POTASSIUM SERPL-SCNC: 3.4 MMOL/L — LOW (ref 3.5–5.3)
PROT SERPL-MCNC: 7.1 G/DL — SIGNIFICANT CHANGE UP (ref 6–8.3)
PROT UR-MCNC: >=1000 MG/DL
RBC # BLD: 4.32 M/UL — SIGNIFICANT CHANGE UP (ref 3.8–5.2)
RBC # FLD: 12.9 % — SIGNIFICANT CHANGE UP (ref 10.3–14.5)
RBC CASTS # UR COMP ASSIST: >50 /HPF — SIGNIFICANT CHANGE UP (ref 0–4)
RH IG SCN BLD-IMP: POSITIVE — SIGNIFICANT CHANGE UP
SODIUM SERPL-SCNC: 138 MMOL/L — SIGNIFICANT CHANGE UP (ref 135–145)
SP GR SPEC: 1.04 — HIGH (ref 1–1.03)
UROBILINOGEN FLD QL: 1 MG/DL — SIGNIFICANT CHANGE UP (ref 0.2–1)
WBC # BLD: 8.83 K/UL — SIGNIFICANT CHANGE UP (ref 3.8–10.5)
WBC # FLD AUTO: 8.83 K/UL — SIGNIFICANT CHANGE UP (ref 3.8–10.5)
WBC UR QL: SIGNIFICANT CHANGE UP /HPF (ref 0–5)

## 2023-08-11 PROCEDURE — 99284 EMERGENCY DEPT VISIT MOD MDM: CPT

## 2023-08-11 PROCEDURE — 76830 TRANSVAGINAL US NON-OB: CPT | Mod: 26

## 2023-08-11 RX ORDER — ONDANSETRON 8 MG/1
4 TABLET, FILM COATED ORAL ONCE
Refills: 0 | Status: COMPLETED | OUTPATIENT
Start: 2023-08-11 | End: 2023-08-11

## 2023-08-11 RX ORDER — ACETAMINOPHEN 500 MG
1000 TABLET ORAL ONCE
Refills: 0 | Status: COMPLETED | OUTPATIENT
Start: 2023-08-11 | End: 2023-08-11

## 2023-08-11 RX ORDER — SODIUM CHLORIDE 9 MG/ML
1000 INJECTION INTRAMUSCULAR; INTRAVENOUS; SUBCUTANEOUS ONCE
Refills: 0 | Status: COMPLETED | OUTPATIENT
Start: 2023-08-11 | End: 2023-08-11

## 2023-08-11 RX ADMIN — Medication 400 MILLIGRAM(S): at 19:07

## 2023-08-11 RX ADMIN — SODIUM CHLORIDE 1000 MILLILITER(S): 9 INJECTION INTRAMUSCULAR; INTRAVENOUS; SUBCUTANEOUS at 19:07

## 2023-08-11 RX ADMIN — ONDANSETRON 4 MILLIGRAM(S): 8 TABLET, FILM COATED ORAL at 19:07

## 2023-08-11 NOTE — ED PROVIDER NOTE - PATIENT PORTAL LINK FT
You can access the FollowMyHealth Patient Portal offered by [facility] by registering at the following website: [email or web address]. By joining [employer]’s FollowMyHealth portal, you will also be able to view your health information using other applications (apps) compatible with our system.

## 2023-08-11 NOTE — ED PROVIDER NOTE - NSFOLLOWUPINSTRUCTIONS_ED_ALL_ED_FT
You were seen in the emergency department for evaluation of abdominal pain and vaginal bleeding, we checked bloods and an internal ultrasound, please see results below.  Please review these results with your primary care physician and obstetrician to establish any follow ups as required.  Please call your obstetrician tomorrow to make a follow up appointment to see in the office for continued evaluation.  You can continue to take over the counter tylenol as needed for pain, please follow the instructions on the bottle.    Please RETURN TO THE EMERGENCY DEPARTMENT OR SEEK IMMEDIATE MEDICAL ATTENTION if you experience any new or worsening symptoms, including but not limited to: worsening abdominal pain, worsening vaginal bleeding, lightheadedness, fainting, fevers, chills, weakness in the arms or legs, leg swelling, seizures, severe headaches.

## 2023-08-11 NOTE — ED ADULT NURSE NOTE - OBJECTIVE STATEMENT
Patient presents to ED for abdominal cramping, vaginal bleed, N/V. She is AA&Ox4, in no acute distress, calm, cooperative. She is . Reports "taking  pill" as advised and is now having bleed with a lot of clots since 2 pm today accompanied by abdominal cramping. States took Tylenol at noon with some relief, but pain came back. Denies CP, dizziness, SOB, dyspnea. Respirations even, unlabored on room air. Appears pale. 20G IV placed left AC, labs drawn and sent. Medicated as ordered.

## 2023-08-11 NOTE — ED PROVIDER NOTE - OBJECTIVE STATEMENT
26y/o F pt currently 6wks 6days pregnant A1 3mo s/p  (full term, no complications) presenting into [facility] ED for evaluation of suprapubic pain and vaginal bleeding after taking misoprostol today.  Pt states she attended a routine  appointment last week with her GYN and was told she was pregnant, and ultrasound 2 days ago confirming yolk sac, and was told she was [id number] pregnant at the time of ultrasound.  She was given mifepristone that day and took her misoprostol at home today.  She notes nausea and vomiting yesterday, and today reports persistent N/V, suprapubic pain and cramping, and vaginal bleeding.  She has changed her pad one time today since the bleeding began, states she is passing clots and feels lightheaded.  She took tylenol at noon today for her pain with some relief.  She states her previous pregnancy was uncomplicated, and she was tx with prophylactic

## 2023-08-11 NOTE — ED PROVIDER NOTE - ATTENDING CONTRIBUTION TO CARE
Brief HPI:  27-year-old female , status post  3 months ago, followed by conception with reported confirmed IUP currently 6 weeks 6 days status post taking misoprostol today for elective  presents for suprapubic pain, vaginal bleeding, dizziness, nausea, and vomiting.  Patient states symptoms started after taking misoprostol.  Took Tylenol at home with little relief.  Denies syncope.    Vitals:   Reviewed    Exam:    GEN:  Non-toxic appearing, non-distressed, speaking full sentences, non-diaphoretic, AAOx3  HEENT:  NCAT, neck supple, EOMI, PERRLA, sclera anicteric, no conjunctival pallor or injection, no stridor, normal voice, no tonsillar exudate, uvula midline  CV:  regular rhythm and rate, s1/s2 audible, no murmurs, rubs or gallops, peripheral pulses 2+ and symmetric  PULM:  non-labored respirations, lungs clear to auscultation bilaterally, no wheezes, crackles or rales  ABD:  non distended, suprapubic tenderness, no rebound, no guarding, negative Copeland's sign, bowel sounds normal, no cvat  MSK:  no gross deformity, non-tender extremities and joints, range of motion grossly normal appearing, no extremity edema, extremities warm and well perfused   NEURO:  AAOx3, CN II-XII intact, motor 5/5 in upper and lower extremities bilaterally, sensation grossly intact in extremities and trunk, finger to nose testing wnl, no nystagmus, negative Romberg, no pronator drift, no gait deficit  SKIN:  warm, dry, no rash or vesicles   :  see resident note     A/P:  27-year-old female , status post  3 months ago, followed by conception with reported confirmed IUP currently 6 weeks 6 days status post taking misoprostol today for elective  presents for suprapubic pain, vaginal bleeding, dizziness, nausea, and vomiting.  Vital signs stable, afebrile.  Exam nontoxic-appearing, abdomen nonperitoneal.  Speculum exam with pooling of blood in vaginal vault.  Suspect symptoms consistent with chemically induced .  Will obtain labs, TVUS, supportive care.  Disposition pending.

## 2023-08-11 NOTE — ED PROVIDER NOTE - CLINICAL SUMMARY MEDICAL DECISION MAKING FREE TEXT BOX
This is a 26y/o woman A1 s/p  3mo ago presenting today 6wks 6d pregnant with suprapubic pain and vaginal bleeding following chemical  (mifepristone 2 days ago, misoprostol today), no complications during her previous pregnancy, both mom and baby healthy, current pregnancy confirmed with hCG and US, unknown if IUP, well appearing today, no abdominal tenderness, oral temp 99.8, will check rectal temperature and vaginal examination, will check bloods including CBC, CMP, type and screen, hCG, urine, and TV-US.  Plan to give pain control, antiemetics, IV fluids, follow up results, and reassess, likely discharge home with return precautions.

## 2023-08-11 NOTE — ED ADULT NURSE NOTE - NSFALLUNIVINTERV_ED_ALL_ED
Bed/Stretcher in lowest position, wheels locked, appropriate side rails in place/Call bell, personal items and telephone in reach/Instruct patient to call for assistance before getting out of bed/chair/stretcher/Non-slip footwear applied when patient is off stretcher/[place] to call system/Physically safe environment - no spills, clutter or unnecessary equipment/Purposeful proactive rounding/Room/bathroom lighting operational, light cord in reach

## 2023-08-11 NOTE — ED PROVIDER NOTE - PHYSICAL EXAMINATION
Const: Awake, alert, no acute distress.  Well appearing.  Moving comfortably on stretcher.  HEENT: NC/AT.  Moist mucous membranes.  No pharyngeal erythema, no exudates.  Eyes: Extraocular movements intact b/l.  Pupils equal, round, and reactive to light b/l.  Conjunctiva pink.  No scleral icterus.  Neck: Neck supple, full ROM without pain.  Cardiac: Regular rate and regular rhythm. S1 S2 present.  Peripheral pulses 2+ and symmetric.  No LE edema.  Resp: Speaking in full sentences. No evidence of respiratory distress.  Breath sounds clear to auscultation b/l.  Abd: Non-distended, no overlying skin changes.  Soft, non-tender, no guarding, no rigidity, no rebound tenderness.  No palpable masses.  Normal bowel sounds in all 4 quadrants.  Back: Spine midline and non-tender. No CVAT.  Skin: Normal coloration.  No rashes, abrasions or lacerations.  Neuro: Awake, alert & oriented x 3.  Moves all extremities spontaneously.  No focal deficits. Const: Awake, alert, no acute distress.  Well appearing.  Moving comfortably on stretcher.  HEENT: NC/AT.  Moist mucous membranes.  No pharyngeal erythema, no exudates.  Eyes: Extraocular movements intact b/l.  Pupils equal, round, and reactive to light b/l.  Conjunctiva pink.  No scleral icterus.  Neck: Neck supple, full ROM without pain.  Cardiac: Regular rate and regular rhythm. S1 S2 present.  Peripheral pulses 2+ and symmetric.  No LE edema.  Resp: Speaking in full sentences. No evidence of respiratory distress.  Breath sounds clear to auscultation b/l.  Abd: Non-distended, no overlying skin changes.  Soft, non-tender, no guarding, no rigidity, no rebound tenderness.  No palpable masses.  Normal bowel sounds in all 4 quadrants.  : Vaginal examination = pooling of blood in the vaginal canal on speculum examination, no active hemorrhage, external cervical os closed, no b/l adnexal masses on bimanual palpation (Chaperone: Hai Naranjo MD).  Back: Spine midline and non-tender. No CVAT.  Skin: Normal coloration.  No rashes, abrasions or lacerations.  Neuro: Awake, alert & oriented x 3.  Moves all extremities spontaneously.  No focal deficits.

## 2023-08-11 NOTE — ED PROVIDER NOTE - PROGRESS NOTE DETAILS
Carlo THOMPSON), PGY-1: pt afebrile with a rectal temp of 100.0F, bloods showing Hb 11.0 which is nonactionable at this time, TV-US showing open internal cervical os and no IUP with is consistent with  in progress, will inform pt to follow up with GYN, pt is ready for discharge. Carlo THOMPSON), PGY-1: on reassessment, pt reports improved symptoms following IV fluids, zofran, and tylenol, tolerating PO in the ED, informed pt of plan to reassess following results of bloods and TV-US, pt in agreement with plan.

## 2023-08-11 NOTE — ED ADULT TRIAGE NOTE - CHIEF COMPLAINT QUOTE
Presents to ED complaining of cramping, vaginal bleeding, vomiting since Wednesday. Pt is currently pregnant and was advised to abort because she had a c/s 3 months ago and was told its risky. Pt took Mifepristone at doctors office on Wednesday and today took Cytotec 800 mcg today.

## 2023-12-27 NOTE — OB PROVIDER H&P - NS_CTXBYPALP_OBGYN_ALL_OB
Mild Yeimi Arreola  Internal Medicine  [address] [place] [address]  Phone: [phone]  Fax: [phone]  Follow Up Time:     Yarelis Sim  Pulmonary Disease  [address] [facility], [address]  [place] [address]  Phone: [phone]  Fax: [phone]  Follow Up Time:    Yeimi Arreola  Internal Medicine  [address] [place] [address]  Phone: [phone]  Fax: [phone]  Follow Up Time:     Yarelis Sim  Pulmonary Disease  [address] [facility], [address]  [place] [address]  Phone: [phone]  Fax: [phone]  Follow Up Time:    Yarelis Sim  Pulmonary Disease  [address] [facility], [address]  [place] [address]  Phone: [phone]  Fax: [phone]  Follow Up Time:     Rashid Castillo  Internal Medicine  [address] [place] [address]  Phone: [phone]  Fax: [phone]  Follow Up Time:    Yarelis Sim  Pulmonary Disease  [address] [facility], [address]  [place] [address]  Phone: [phone]  Fax: [phone]  Follow Up Time:     Rashid Castillo  Internal Medicine  [address] [place] [address]  Phone: [phone]  Fax: [phone]  Follow Up Time:

## 2024-03-18 ENCOUNTER — TRANSCRIPTION ENCOUNTER (OUTPATIENT)
Age: 29
End: 2024-03-18

## 2024-03-27 NOTE — OB RN PATIENT PROFILE - NSWEIGHTCALCTOOLDRUG_GEN_A_CORE
I agree with the Care Coordinator's Plan of Care   
Received NT call from patient's father.    Father reports patient fell and injured her ankle today.  He thinks it may be sprained.  Advised the only way to confirm sprain vs fracture would to be evaluated and have xrays taken.  Father is not currently with patient, stating he is at [facility] and patient is in [place].  Father stated he is going to go pick patient up in [place].  Advised father to take patient to [facility], phone number given for locations and hours, and advised if unable to get her there prior to their end of day time to contact [facility] for their urgent care location and time.    Father stated he will take her to one of the ortho urgent cares.    
 used

## 2024-04-10 ENCOUNTER — EMERGENCY (EMERGENCY)
Facility: HOSPITAL | Age: 29
LOS: 1 days | Discharge: HOME | End: 2024-04-10
Attending: EMERGENCY MEDICINE | Admitting: EMERGENCY MEDICINE
Payer: MEDICAID

## 2024-04-10 VITALS
TEMPERATURE: 98 F | WEIGHT: 102.96 LBS | RESPIRATION RATE: 16 BRPM | DIASTOLIC BLOOD PRESSURE: 87 MMHG | HEIGHT: 62 IN | SYSTOLIC BLOOD PRESSURE: 118 MMHG | OXYGEN SATURATION: 100 % | HEART RATE: 90 BPM

## 2024-04-10 VITALS
OXYGEN SATURATION: 95 % | HEART RATE: 80 BPM | TEMPERATURE: 98 F | DIASTOLIC BLOOD PRESSURE: 76 MMHG | RESPIRATION RATE: 18 BRPM | SYSTOLIC BLOOD PRESSURE: 118 MMHG

## 2024-04-10 DIAGNOSIS — Z98.891 HISTORY OF UTERINE SCAR FROM PREVIOUS SURGERY: Chronic | ICD-10-CM

## 2024-04-10 LAB
ADD ON TEST-SPECIMEN IN LAB: SIGNIFICANT CHANGE UP
ALBUMIN SERPL ELPH-MCNC: 4.5 G/DL — SIGNIFICANT CHANGE UP (ref 3.3–5)
ALP SERPL-CCNC: 52 U/L — SIGNIFICANT CHANGE UP (ref 40–120)
ALT FLD-CCNC: 20 U/L — SIGNIFICANT CHANGE UP (ref 4–33)
ANION GAP SERPL CALC-SCNC: 17 MMOL/L — HIGH (ref 7–14)
APPEARANCE UR: CLEAR — SIGNIFICANT CHANGE UP
AST SERPL-CCNC: 23 U/L — SIGNIFICANT CHANGE UP (ref 4–32)
BASOPHILS # BLD AUTO: 0.01 K/UL — SIGNIFICANT CHANGE UP (ref 0–0.2)
BASOPHILS NFR BLD AUTO: 0.2 % — SIGNIFICANT CHANGE UP (ref 0–2)
BILIRUB SERPL-MCNC: 1.2 MG/DL — SIGNIFICANT CHANGE UP (ref 0.2–1.2)
BILIRUB UR-MCNC: NEGATIVE — SIGNIFICANT CHANGE UP
BUN SERPL-MCNC: 10 MG/DL — SIGNIFICANT CHANGE UP (ref 7–23)
CALCIUM SERPL-MCNC: 9.5 MG/DL — SIGNIFICANT CHANGE UP (ref 8.4–10.5)
CHLORIDE SERPL-SCNC: 103 MMOL/L — SIGNIFICANT CHANGE UP (ref 98–107)
CO2 SERPL-SCNC: 19 MMOL/L — LOW (ref 22–31)
COLOR SPEC: YELLOW — SIGNIFICANT CHANGE UP
CREAT SERPL-MCNC: 0.62 MG/DL — SIGNIFICANT CHANGE UP (ref 0.5–1.3)
DIFF PNL FLD: NEGATIVE — SIGNIFICANT CHANGE UP
EGFR: 124 ML/MIN/1.73M2 — SIGNIFICANT CHANGE UP
EOSINOPHIL # BLD AUTO: 0.02 K/UL — SIGNIFICANT CHANGE UP (ref 0–0.5)
EOSINOPHIL NFR BLD AUTO: 0.3 % — SIGNIFICANT CHANGE UP (ref 0–6)
GLUCOSE SERPL-MCNC: 79 MG/DL — SIGNIFICANT CHANGE UP (ref 70–99)
GLUCOSE UR QL: NEGATIVE MG/DL — SIGNIFICANT CHANGE UP
HCG SERPL-ACNC: <1 MIU/ML — SIGNIFICANT CHANGE UP
HCT VFR BLD CALC: 41.2 % — SIGNIFICANT CHANGE UP (ref 34.5–45)
HGB BLD-MCNC: 13 G/DL — SIGNIFICANT CHANGE UP (ref 11.5–15.5)
IANC: 5.68 K/UL — SIGNIFICANT CHANGE UP (ref 1.8–7.4)
IMM GRANULOCYTES NFR BLD AUTO: 0.3 % — SIGNIFICANT CHANGE UP (ref 0–0.9)
KETONES UR-MCNC: >=160 MG/DL
LEUKOCYTE ESTERASE UR-ACNC: ABNORMAL
LIDOCAIN IGE QN: 42 U/L — SIGNIFICANT CHANGE UP (ref 7–60)
LYMPHOCYTES # BLD AUTO: 0.6 K/UL — LOW (ref 1–3.3)
LYMPHOCYTES # BLD AUTO: 9.2 % — LOW (ref 13–44)
MCHC RBC-ENTMCNC: 23.6 PG — LOW (ref 27–34)
MCHC RBC-ENTMCNC: 31.6 GM/DL — LOW (ref 32–36)
MCV RBC AUTO: 74.6 FL — LOW (ref 80–100)
MONOCYTES # BLD AUTO: 0.21 K/UL — SIGNIFICANT CHANGE UP (ref 0–0.9)
MONOCYTES NFR BLD AUTO: 3.2 % — SIGNIFICANT CHANGE UP (ref 2–14)
NEUTROPHILS # BLD AUTO: 5.68 K/UL — SIGNIFICANT CHANGE UP (ref 1.8–7.4)
NEUTROPHILS NFR BLD AUTO: 86.8 % — HIGH (ref 43–77)
NITRITE UR-MCNC: NEGATIVE — SIGNIFICANT CHANGE UP
NRBC # BLD: 0 /100 WBCS — SIGNIFICANT CHANGE UP (ref 0–0)
NRBC # FLD: 0 K/UL — SIGNIFICANT CHANGE UP (ref 0–0)
PH UR: 5.5 — SIGNIFICANT CHANGE UP (ref 5–8)
PLATELET # BLD AUTO: 273 K/UL — SIGNIFICANT CHANGE UP (ref 150–400)
POTASSIUM SERPL-MCNC: 3.6 MMOL/L — SIGNIFICANT CHANGE UP (ref 3.5–5.3)
POTASSIUM SERPL-SCNC: 3.6 MMOL/L — SIGNIFICANT CHANGE UP (ref 3.5–5.3)
PROT SERPL-MCNC: 8.2 G/DL — SIGNIFICANT CHANGE UP (ref 6–8.3)
PROT UR-MCNC: SIGNIFICANT CHANGE UP MG/DL
RBC # BLD: 5.52 M/UL — HIGH (ref 3.8–5.2)
RBC # FLD: 22.4 % — HIGH (ref 10.3–14.5)
SODIUM SERPL-SCNC: 139 MMOL/L — SIGNIFICANT CHANGE UP (ref 135–145)
SP GR SPEC: 1.03 — HIGH (ref 1–1.03)
UROBILINOGEN FLD QL: 1 MG/DL — SIGNIFICANT CHANGE UP (ref 0.2–1)
WBC # BLD: 6.54 K/UL — SIGNIFICANT CHANGE UP (ref 3.8–10.5)
WBC # FLD AUTO: 6.54 K/UL — SIGNIFICANT CHANGE UP (ref 3.8–10.5)

## 2024-04-10 PROCEDURE — 99236 HOSP IP/OBS SAME DATE HI 85: CPT | Mod: 25

## 2024-04-10 RX ORDER — SODIUM CHLORIDE 9 MG/ML
1000 INJECTION INTRAMUSCULAR; INTRAVENOUS; SUBCUTANEOUS ONCE
Refills: 0 | Status: COMPLETED | OUTPATIENT
Start: 2024-04-10 | End: 2024-04-10

## 2024-04-10 RX ORDER — ONDANSETRON 8 MG/1
4 TABLET, FILM COATED ORAL ONCE
Refills: 0 | Status: COMPLETED | OUTPATIENT
Start: 2024-04-10 | End: 2024-04-10

## 2024-04-10 RX ORDER — ONDANSETRON 8 MG/1
1 TABLET, FILM COATED ORAL
Qty: 1 | Refills: 0
Start: 2024-04-10

## 2024-04-10 RX ORDER — SODIUM CHLORIDE 9 MG/ML
1000 INJECTION, SOLUTION INTRAVENOUS
Refills: 0 | Status: DISCONTINUED | OUTPATIENT
Start: 2024-04-10 | End: 2024-04-13

## 2024-04-10 RX ORDER — FAMOTIDINE 10 MG/ML
20 INJECTION INTRAVENOUS ONCE
Refills: 0 | Status: COMPLETED | OUTPATIENT
Start: 2024-04-10 | End: 2024-04-10

## 2024-04-10 RX ADMIN — Medication 30 MILLILITER(S): at 04:36

## 2024-04-10 RX ADMIN — SODIUM CHLORIDE 1000 MILLILITER(S): 9 INJECTION INTRAMUSCULAR; INTRAVENOUS; SUBCUTANEOUS at 04:36

## 2024-04-10 RX ADMIN — SODIUM CHLORIDE 100 MILLILITER(S): 9 INJECTION, SOLUTION INTRAVENOUS at 09:58

## 2024-04-10 RX ADMIN — SODIUM CHLORIDE 1000 MILLILITER(S): 9 INJECTION INTRAMUSCULAR; INTRAVENOUS; SUBCUTANEOUS at 07:43

## 2024-04-10 RX ADMIN — SODIUM CHLORIDE 1000 MILLILITER(S): 9 INJECTION INTRAMUSCULAR; INTRAVENOUS; SUBCUTANEOUS at 06:10

## 2024-04-10 RX ADMIN — FAMOTIDINE 20 MILLIGRAM(S): 10 INJECTION INTRAVENOUS at 04:35

## 2024-04-10 RX ADMIN — ONDANSETRON 4 MILLIGRAM(S): 8 TABLET, FILM COATED ORAL at 09:02

## 2024-04-10 RX ADMIN — ONDANSETRON 4 MILLIGRAM(S): 8 TABLET, FILM COATED ORAL at 04:35

## 2024-04-10 NOTE — ED CDU PROVIDER INITIAL DAY NOTE - CLINICAL SUMMARY MEDICAL DECISION MAKING FREE TEXT BOX
28yF w/no stated pmhx presenting to the ED with nausea, vomiting and diarrhea that began yesterday. Pt states when she broke her fast she developed symptoms, did have some food from a restaurant. Associated she feels lightheaded and has generalized weakness.   In main ED vitals stable, non tender abd, cbc/cmp unactionable, hcg negative, ua with ketones, concern for gastroenteritis, unable to tolerate PO after 2 doses zofran  Sent to CDU for IV fluids, antiemetics, symptomatic care 10-Rigoberto-2017 17:18

## 2024-04-10 NOTE — ED CDU PROVIDER DISPOSITION NOTE - CLINICAL COURSE
28yF w/no stated pmhx presenting to the ED with nausea, vomiting and diarrhea that began yesterday. Pt states when she broke her fast she developed symptoms, did have some food from a restaurant. Associated she feels lightheaded and has generalized weakness.  In main ED vitals stable, non tender abd, cbc/cmp unactionable, hcg negative, ua with ketones, concern for gastroenteritis, unable to tolerate PO after 2 doses zofran  Sent to CDU for IV fluids, antiemetics, symptomatic care. Pt. feeling better and tolerating PO without difficulty. Labs and vital signs WNL - stable for DC at this time.

## 2024-04-10 NOTE — ED CDU PROVIDER DISPOSITION NOTE - NSFOLLOWUPINSTRUCTIONS_ED_ALL_ED_FT
Acute Nausea and Vomiting  WHAT YOU NEED TO KNOW:  Acute nausea and vomiting start suddenly, worsen quickly, and last a short time.  DISCHARGE INSTRUCTIONS:  Return to the emergency department if:  You see blood in your vomit or your bowel movements.  You have sudden, severe pain in your chest and upper abdomen after hard vomiting or retching.  You have swelling in your neck and chest.  You are dizzy, cold, and thirsty and your eyes and mouth are dry.  You are urinating very little or not at all.  You have muscle weakness, leg cramps, and trouble breathing.  Your heart is beating much faster than normal.  You continue to vomit for more than 48 hours.  Contact your healthcare provider if:  You have frequent dry heaves (vomiting but nothing comes out).  Your nausea and vomiting does not get better or go away after you use medicine.  You have questions or concerns about your condition or treatment.  Medicines: You may need any of the following:  Medicines may be given to calm your stomach and stop your vomiting. You may also need medicines to help you feel more relaxed or to stop nausea and vomiting caused by motion sickness.  Gastrointestinal stimulants are used to help empty your stomach and bowels. This may help decrease nausea and vomiting.  Take your medicine as directed. Contact your healthcare provider if you think your medicine is not helping or if you have side effects. Tell him or her if you are allergic to any medicine. Keep a list of the medicines, vitamins, and herbs you take. Include the amounts, and when and why you take them. Bring the list or the pill bottles to follow-up visits. Carry your medicine list with you in case of an emergency.  Prevent or manage acute nausea and vomiting:  Do not drink alcohol. Alcohol may upset or irritate your stomach. Too much alcohol can also cause acute nausea and vomiting.  Control stress. Headaches due to stress may cause nausea and vomiting. Find ways to relax and manage your stress. Get more rest and sleep.  Drink more liquids as directed. Vomiting can lead to dehydration. It is important to drink more liquids to help replace lost body fluids. Ask your healthcare provider how much liquid to drink each day and which liquids are best for you. Your provider may recommend that you drink an oral rehydration solution (ORS). ORS contains water, salts, and sugar that are needed to replace the lost body fluids. Ask what kind of ORS to use, how much to drink, and where to get it.  Eat smaller meals, more often. Eat small amounts of food every 2 to 3 hours, even if you are not hungry. Food in your stomach may decrease your nausea.  Talk to your healthcare provider before you take over-the-counter (OTC) medicines. These medicines can cause serious problems if you use certain other medicines, or you have a medical condition. You may have problems if you use too much or use them for longer than the label says. Follow directions on the label carefully.  Follow up with your healthcare provider as directed: Write down your questions so you remember to ask them during your follow-up visits.  Náuseas y vómitos agudos    LO QUE NECESITA SABER:  Las náuseas y los vómitos agudos comienzan de forma repentina, empeoran rápidamente y araya un período breve de tiempo.  INSTRUCCIONES SOBRE EL RICCARDO HOSPITALARIA:  Busque atención médica de inmediato si:  Usted nota elle en byrne vómito o en sara evacuaciones.  Usted siente un dolor súbito e intenso en el pecho y la parte superior de byrne abdomen después de tener vómitos yecenia o tratar de vomitar.  Usted tiene el shant y el pecho inflamados.  Usted está mareado, tiene frío, sed y sequedad en los ojos y la boca.  Usted está orinando muy poco o nada en absoluto.  Usted tiene debilidad muscular, calambres en las piernas y dificultad para respirar.  Byrne corazón late más rápido que de costumbre.  Usted continúa vomitando por más de 48 horas.  Comuníquese con byrne médico si:  Usted tiene arcadas secas (vómitos sin que salga nada) frecuentes.  Sara náusea y vómitos no mejoran ni desaparecen después de usar el medicamento.  Usted tiene preguntas o inquietudes acerca de byrne condición o tratamiento.  Medicamentos:Es posible que usted necesite alguno de los siguientes:  Los medicamentosse puede administrar para calmarle el estómago y detener sara vómitos. Usted también puede necesitar medicamentos para ayudarlo a sentirse más relajado o para detener las náuseas y los vómitos causados por el mareo por movimiento.  Se usan los estimulantes gastrointestinalespara ayudar a vaciar byrne estómago y los intestinos. [place] puede ayudar para reducir las náuseas y el vómito.  [place] sara medicamentos everton se le haya indicado.Consulte con byrne médico si usted smiley que byrne medicamento no le está ayudando o si presenta efectos secundarios. Infórmele si es alérgico a cualquier medicamento. Mantenga horacio lista actualizada de los medicamentos, las vitaminas y los productos herbales que jf. Incluya los siguientes datos de los medicamentos: cantidad, frecuencia y motivo de administración. Traiga con usted la lista o los envases de las píldoras a sara citas de seguimiento. Lleve la lista de los medicamentos con usted en marlen de horacio emergencia.  Evite o controle las náuseas y los vómitos agudos:  No consuma alcohol.El alcohol podría causarle malestar o irritación estomacal. Horacio cantidad elevada de alcohol también puede causar náuseas y vómitos agudos.  Controle el estrés.Los rene de farzaneh que son el resultado de tensión nerviosa pueden causar náuseas y vómitos. Busque la manera de relajarse y controlar el estrés. Descanse y duerma más.  [place] más líquidos everton se le indique.Los vómitos pueden llevar a la deshidratación. Es importante beber más líquidos para ayudar a reemplazar los fluidos corporales perdidos. Pregunte a byrne médico sobre la cantidad de líquido que necesita khris todos los ayad y cuáles le recomienda. Byrne médico podría recomendarle que tome horacio solución de rehidratación oral (SRO). Las soluciones de rehidratación oral contienen la cantidad adecuada de agua, sales y azúcar que necesita para restituir los líquidos que perdió byrne organismo. Pregunte qué tipo de solución de rehidratación oral debe usar, qué cantidad debe khris y dónde puede obtenerla.  Ingiera comidas más pequeñas, más a menudo.Coma pequeñas cantidades de comida cada 2 o 3 horas, incluso si no tiene hambre. Sara náuseas podrían disminuir si tiene comida en el estómago.  Hable con byrne médico antes de khris medicamentos de venta sarah.Estos medicamentos pueden causar problemas serios si los usa junto con ciertos medicamentos o si tiene determinadas condiciones médicas. Podrían tener problemas si usa horacio dosis demasiado riccardo o los usa odell más tiempo de lo indicado. Siga las indicaciones de la etiqueta al pie de la letra.  Acuda a sara consultas de control con byrne médico según le indicaron.Anote sara preguntas para que se acuerde de hacerlas odell sara visitas.

## 2024-04-10 NOTE — ED ADULT NURSE REASSESSMENT NOTE - NS ED NURSE REASSESS COMMENT FT1
Break RN: received report from MARY KAY Santizo @[id number]. pt stable resting in bed in non acute distress. Respirations even and unlabored. pt endorses she is feeling better, able to tolerate small amounts of baked chicken. denies nausea. family at bedside. Safety maintained.

## 2024-04-10 NOTE — ED PROVIDER NOTE - CLINICAL SUMMARY MEDICAL DECISION MAKING FREE TEXT BOX
28-year-old female G1, P1 that has no other medical problems that is presenting with nausea vomiting and diarrhea x 1 day.  No blood in any of the emesis or diarrhea reported.  This can possibly be food poisoning versus reaction to fasting and eating.  However also possible pregnancy as patient is on prenatals and LMP was March 16.  Will obtain labs provide fluids and medications and reassess.

## 2024-04-10 NOTE — ED CDU PROVIDER INITIAL DAY NOTE - ATTENDING APP SHARED VISIT CONTRIBUTION OF CARE
CDU MD CHUNG:  I performed a face to face bedside interview with patient regarding history of present illness, review of symptoms and past medical history. I completed an independent physical exam.  I have discussed patient's plan of care with PA.   I agree with note as stated above, having amended the EMR as needed to reflect my findings. I have discussed the assessment and plan of care.  This includes during the time I functioned as the attending physician for this patient.    CDU for inability to shreyas po.  IVF, antiemetic, po trial.

## 2024-04-10 NOTE — ED ADULT NURSE REASSESSMENT NOTE - NS ED NURSE REASSESS COMMENT FT1
Pt awake and alert, A&OX4, resp even and unlabored. pt endorsing slight relief in nausea.  Denies CP, SOB, HA, dizziness, palpitations, blurry vision. Resting comfortably.

## 2024-04-10 NOTE — ED CDU PROVIDER INITIAL DAY NOTE - OBJECTIVE STATEMENT
28yF w/no stated pmhx presenting to the ED with nausea, vomiting and diarrhea that began yesterday. Pt states when she broke her fast she developed symptoms, did have some food from a restaurant. Associated she feels lightheaded and has generalized weakness. Pt denies fever/chills, abdominal pain, blood in vomitus or stool, headaches, cp, sob, palpitations, urinary complaints, pelvic pain, recent travel or illness or any other concerns.  In main ED vitals stable, non tender abd, cbc/cmp unactionable, hcg negative, ua with ketones, concern for gastroenteritis, unable to tolerate PO after 2 doses zofran  Sent to CDU for IV fluids, antiemetics, symptomatic care

## 2024-04-10 NOTE — ED PROVIDER NOTE - PROGRESS NOTE DETAILS
SESAR Richter: Pt signed out to me awaiting UA result and reassessment. Likely gastroenteritis. MD CHO:  I received s/o on this pt from Dr. Mcdonald.  Plan:  po challenge.  Pt failed po challenge, will obs for continued ivf and antiemetic therapy.

## 2024-04-10 NOTE — ED PROVIDER NOTE - PHYSICAL EXAMINATION
Well-appearing no acute distress lips are dry oropharynx otherwise moist.  Heart is regular rate and rhythm lungs are clear abdomen soft nontender diffusely negative Copealnd's negative [place]'s no rebound or guarding no masses.

## 2024-04-10 NOTE — ED PROVIDER NOTE - OBJECTIVE STATEMENT
28-year-old female  status post  2023 without any other medical problems currently only on prenatal vitamins that presents with nausea, vomiting, diarrhea x 1 day.  Patient states that she has been fasting and when she broke her fast she started having some juice and other foods but having nausea, vomiting, diarrhea.  Yesterday she did eat some restaurant food  had the same but no symptoms.  Denies any fevers, chills, chest pain, shortness of breath, URI symptoms or UTI symptoms.  No foreign travel no sick contacts.  Patient is passing gas.  Non-smoker no drinking no drugs.  LMP was 2024.

## 2024-04-10 NOTE — ED PROVIDER NOTE - NSTIMEPROVIDERCAREINITIATE_GEN_ER
Patient notified via voicemail . Advised to call back if she has any questions or concerns on 6/2 @10:46AM . 10-Apr-2024 03:57

## 2024-04-10 NOTE — ED CDU PROVIDER INITIAL DAY NOTE - DETAILS
28yF w/no stated pmhx presenting to the ED with nausea, vomiting and diarrhea that began yesterday. Pt states when she broke her fast she developed symptoms, did have some food from a restaurant. Associated she feels lightheaded and has generalized weakness.   In main ED vitals stable, non tender abd, cbc/cmp unactionable, hcg negative, ua with ketones, concern for gastroenteritis, unable to tolerate PO after 2 doses zofran  Sent to CDU for IV fluids, antiemetics, symptomatic care

## 2024-04-10 NOTE — ED ADULT NURSE NOTE - OBJECTIVE STATEMENT
Pt is A&O x 4, ambulatory w/o assistance, shows no signs of acute distress. presents to the ED w/ nausea and vomiting x 2 days. Also endorsing multiple episodes of diarrhea. Pt states she ate street food 2 days ago and woke up the next day unable to tolerate anything PO. Denies abdominal pain, gu discomfort, fevers/chills, SOB or chest discomfort. VSS. Respirations even and unlabored. Left AC 20 gauge IV line placed and labs drawn. Medications administered. Pending lab results and reassessment.

## 2024-04-10 NOTE — ED CDU PROVIDER DISPOSITION NOTE - ATTENDING APP SHARED VISIT CONTRIBUTION OF CARE
28-year-old female no past medical history presented with nausea vomiting diarrhea.  Patient was placed in CDU for IV fluids antiemetics and supportive care.  Patient is feeling much better she has been tolerating oral she is very eager to go home to her 12-month-old baby.  She states that she has gone to the bathroom a few times she has not had no diarrhea and her vomiting has stopped.  She was given instructions to keep a bland diet to keep well-hydrated and to return if she has any new or concerning symptoms.  Patient verbalizes understanding.  Will send Zofran to the pharmacy for nausea.

## 2024-04-10 NOTE — ED ADULT TRIAGE NOTE - CHIEF COMPLAINT QUOTE
c/o nausea, vomiting, & diarrhea x 1 day. denies chest pain, SOB, fevers/chills, dysuria sick contacts. LMP 03/19 denies past medical Hx

## 2024-04-10 NOTE — ED PROVIDER NOTE - NSFOLLOWUPINSTRUCTIONS_ED_ALL_ED_FT
You were seen in the emergency department tonight for having nausea, vomiting, diarrhea x 1 day.    We obtained some blood test which did not show any abnormalities.    Your condition is most likely secondary to food poisoning meaning you ate something that was spoiled and your body was trying to get rid of it    We provided you with some medications here in the emergency department including a medication called Zofran which will help with nausea in the meantime.  Please take this as prescribed to your pharmacy.  You can take 1 pill every 8 hours if you feel nauseous.    The medication is to help you at least be able to drink and eat without throwing up.  You may have persistent diarrhea for the next few days while your body is try to get rid of the abnormal foods that you ingested.    Please stay hydrated by drinking plenty of fluids and get plenty of rest.    With any patient that comes to the emergency department for evaluation we recommend that once you are discharged from the hospital you should follow-up with your primary care doctor within the next 1 to 2 days to make sure that you are getting better not worse.  If your symptoms worsen and you cannot eat or drink without throwing up please come back to the emergency department for further evaluation and treatment otherwise.

## 2024-04-10 NOTE — ED CDU PROVIDER DISPOSITION NOTE - PATIENT PORTAL LINK FT
You can access the FollowMyHealth Patient Portal offered by [facility] by registering at the following website: [email or web address]. By joining [employer]s FollowMyHealth portal, you will also be able to view your health information using other applications (apps) compatible with our system.

## 2024-04-10 NOTE — ED ADULT NURSE REASSESSMENT NOTE - NS ED NURSE REASSESS COMMENT FT1
report given to CDU RN. Pt awake and alert, A&OX4, resp even and unlabored. Denies CP, SOB, HA, dizziness, palpitations, blurry vision. Resting comfortably.  '

## 2024-04-11 LAB
CULTURE RESULTS: SIGNIFICANT CHANGE UP
SPECIMEN SOURCE: SIGNIFICANT CHANGE UP

## 2024-06-22 ENCOUNTER — EMERGENCY (EMERGENCY)
Facility: HOSPITAL | Age: 29
LOS: 1 days | Discharge: HOME | End: 2024-06-22
Attending: EMERGENCY MEDICINE | Admitting: EMERGENCY MEDICINE
Payer: MEDICAID

## 2024-06-22 VITALS
TEMPERATURE: 98 F | DIASTOLIC BLOOD PRESSURE: 74 MMHG | HEIGHT: 62 IN | WEIGHT: 93.92 LBS | HEART RATE: 94 BPM | RESPIRATION RATE: 16 BRPM | SYSTOLIC BLOOD PRESSURE: 107 MMHG | OXYGEN SATURATION: 100 %

## 2024-06-22 VITALS
DIASTOLIC BLOOD PRESSURE: 70 MMHG | OXYGEN SATURATION: 99 % | SYSTOLIC BLOOD PRESSURE: 106 MMHG | HEART RATE: 70 BPM | RESPIRATION RATE: 16 BRPM | TEMPERATURE: 98 F

## 2024-06-22 DIAGNOSIS — Z98.891 HISTORY OF UTERINE SCAR FROM PREVIOUS SURGERY: Chronic | ICD-10-CM

## 2024-06-22 LAB
ALBUMIN SERPL ELPH-MCNC: 4.1 G/DL — SIGNIFICANT CHANGE UP (ref 3.3–5)
ALP SERPL-CCNC: 43 U/L — SIGNIFICANT CHANGE UP (ref 40–120)
ALT FLD-CCNC: 17 U/L — SIGNIFICANT CHANGE UP (ref 4–33)
ANION GAP SERPL CALC-SCNC: 15 MMOL/L — HIGH (ref 7–14)
AST SERPL-CCNC: 21 U/L — SIGNIFICANT CHANGE UP (ref 4–32)
BASOPHILS # BLD AUTO: 0.01 K/UL — SIGNIFICANT CHANGE UP (ref 0–0.2)
BASOPHILS NFR BLD AUTO: 0.1 % — SIGNIFICANT CHANGE UP (ref 0–2)
BILIRUB SERPL-MCNC: 0.8 MG/DL — SIGNIFICANT CHANGE UP (ref 0.2–1.2)
BLD GP AB SCN SERPL QL: NEGATIVE — SIGNIFICANT CHANGE UP
BUN SERPL-MCNC: 6 MG/DL — LOW (ref 7–23)
CALCIUM SERPL-MCNC: 9.4 MG/DL — SIGNIFICANT CHANGE UP (ref 8.4–10.5)
CHLORIDE SERPL-SCNC: 102 MMOL/L — SIGNIFICANT CHANGE UP (ref 98–107)
CO2 SERPL-SCNC: 19 MMOL/L — LOW (ref 22–31)
CREAT SERPL-MCNC: 0.43 MG/DL — LOW (ref 0.5–1.3)
EGFR: 135 ML/MIN/1.73M2 — SIGNIFICANT CHANGE UP
EOSINOPHIL # BLD AUTO: 0.03 K/UL — SIGNIFICANT CHANGE UP (ref 0–0.5)
EOSINOPHIL NFR BLD AUTO: 0.4 % — SIGNIFICANT CHANGE UP (ref 0–6)
FLUAV AG NPH QL: SIGNIFICANT CHANGE UP
FLUBV AG NPH QL: SIGNIFICANT CHANGE UP
GLUCOSE SERPL-MCNC: 78 MG/DL — SIGNIFICANT CHANGE UP (ref 70–99)
HCG SERPL-ACNC: SIGNIFICANT CHANGE UP MIU/ML
HCT VFR BLD CALC: 39.9 % — SIGNIFICANT CHANGE UP (ref 34.5–45)
HGB BLD-MCNC: 13.6 G/DL — SIGNIFICANT CHANGE UP (ref 11.5–15.5)
IANC: 4.86 K/UL — SIGNIFICANT CHANGE UP (ref 1.8–7.4)
IMM GRANULOCYTES NFR BLD AUTO: 0.3 % — SIGNIFICANT CHANGE UP (ref 0–0.9)
LYMPHOCYTES # BLD AUTO: 1.69 K/UL — SIGNIFICANT CHANGE UP (ref 1–3.3)
LYMPHOCYTES # BLD AUTO: 24.1 % — SIGNIFICANT CHANGE UP (ref 13–44)
MCHC RBC-ENTMCNC: 27.2 PG — SIGNIFICANT CHANGE UP (ref 27–34)
MCHC RBC-ENTMCNC: 34.1 GM/DL — SIGNIFICANT CHANGE UP (ref 32–36)
MCV RBC AUTO: 79.8 FL — LOW (ref 80–100)
MONOCYTES # BLD AUTO: 0.41 K/UL — SIGNIFICANT CHANGE UP (ref 0–0.9)
MONOCYTES NFR BLD AUTO: 5.8 % — SIGNIFICANT CHANGE UP (ref 2–14)
NEUTROPHILS # BLD AUTO: 4.86 K/UL — SIGNIFICANT CHANGE UP (ref 1.8–7.4)
NEUTROPHILS NFR BLD AUTO: 69.3 % — SIGNIFICANT CHANGE UP (ref 43–77)
NRBC # BLD: 0 /100 WBCS — SIGNIFICANT CHANGE UP (ref 0–0)
NRBC # FLD: 0 K/UL — SIGNIFICANT CHANGE UP (ref 0–0)
PLATELET # BLD AUTO: 256 K/UL — SIGNIFICANT CHANGE UP (ref 150–400)
POTASSIUM SERPL-MCNC: 4 MMOL/L — SIGNIFICANT CHANGE UP (ref 3.5–5.3)
POTASSIUM SERPL-SCNC: 4 MMOL/L — SIGNIFICANT CHANGE UP (ref 3.5–5.3)
PROT SERPL-MCNC: 7.3 G/DL — SIGNIFICANT CHANGE UP (ref 6–8.3)
RBC # BLD: 5 M/UL — SIGNIFICANT CHANGE UP (ref 3.8–5.2)
RBC # FLD: 14.7 % — HIGH (ref 10.3–14.5)
RH IG SCN BLD-IMP: POSITIVE — SIGNIFICANT CHANGE UP
RSV RNA NPH QL NAA+NON-PROBE: SIGNIFICANT CHANGE UP
SARS-COV-2 RNA SPEC QL NAA+PROBE: SIGNIFICANT CHANGE UP
SODIUM SERPL-SCNC: 136 MMOL/L — SIGNIFICANT CHANGE UP (ref 135–145)
WBC # BLD: 7.02 K/UL — SIGNIFICANT CHANGE UP (ref 3.8–10.5)
WBC # FLD AUTO: 7.02 K/UL — SIGNIFICANT CHANGE UP (ref 3.8–10.5)

## 2024-06-22 PROCEDURE — 76817 TRANSVAGINAL US OBSTETRIC: CPT | Mod: 26

## 2024-06-22 PROCEDURE — 99285 EMERGENCY DEPT VISIT HI MDM: CPT

## 2024-06-22 PROCEDURE — 93010 ELECTROCARDIOGRAM REPORT: CPT

## 2024-06-22 RX ORDER — SODIUM CHLORIDE 9 MG/ML
1000 INJECTION, SOLUTION INTRAVENOUS
Refills: 0 | Status: ACTIVE | OUTPATIENT
Start: 2024-06-22 | End: 2024-06-22

## 2024-06-22 RX ORDER — METOCLOPRAMIDE HCL 10 MG
10 TABLET ORAL ONCE
Refills: 0 | Status: COMPLETED | OUTPATIENT
Start: 2024-06-22 | End: 2024-06-22

## 2024-06-22 RX ORDER — ACETAMINOPHEN 500 MG
975 TABLET ORAL ONCE
Refills: 0 | Status: COMPLETED | OUTPATIENT
Start: 2024-06-22 | End: 2024-06-22

## 2024-06-22 RX ORDER — SODIUM CHLORIDE 9 MG/ML
1000 INJECTION INTRAMUSCULAR; INTRAVENOUS; SUBCUTANEOUS ONCE
Refills: 0 | Status: COMPLETED | OUTPATIENT
Start: 2024-06-22 | End: 2024-06-22

## 2024-06-22 RX ORDER — MECLIZINE HCL 12.5 MG
25 TABLET ORAL ONCE
Refills: 0 | Status: COMPLETED | OUTPATIENT
Start: 2024-06-22 | End: 2024-06-22

## 2024-06-22 RX ADMIN — Medication 975 MILLIGRAM(S): at 15:14

## 2024-06-22 RX ADMIN — SODIUM CHLORIDE 1000 MILLILITER(S): 9 INJECTION INTRAMUSCULAR; INTRAVENOUS; SUBCUTANEOUS at 15:14

## 2024-06-22 RX ADMIN — Medication 10 MILLIGRAM(S): at 15:14

## 2024-06-22 RX ADMIN — SODIUM CHLORIDE 1000 MILLILITER(S): 9 INJECTION, SOLUTION INTRAVENOUS at 16:38

## 2024-06-22 RX ADMIN — Medication 25 MILLIGRAM(S): at 15:14

## 2024-06-22 NOTE — ED PROVIDER NOTE - OBJECTIVE STATEMENT
27 y/o F no PMH  approx 9 weeks GA c/o room spinning dizziness, worse with head movement, gradual onset, worsening HA, chest pressure since last night. Pt has not taken anything for her sxs. Pt states she also started having vaginal spotting today. Pt has had IUP confirmed on US 2 weeks ago. Denies fever, chills, tinnitus, congestion, sore throat, cough, SOB, abdominal pain, vomiting, diarrhea, dysuria. le edema.

## 2024-06-22 NOTE — ED PROVIDER NOTE - PROGRESS NOTE DETAILS
Pt notes sxs completely resolved, states she feels much better, tolerated PO well, stable for dc home at this time

## 2024-06-22 NOTE — ED PROVIDER NOTE - CLINICAL SUMMARY MEDICAL DECISION MAKING FREE TEXT BOX
Pt 9 weeks GA c/o chest pressure, vertigo, gradual onset ha, and vaginal spotting - will r/o arrhythmia with EKG, anemia, electrolyte abnormality/dehydration, uti and assess pregnancy with cbc, cmp hcg, ua/ucx, tvus, type and screen

## 2024-06-22 NOTE — ED ADULT NURSE REASSESSMENT NOTE - NS ED NURSE REASSESS COMMENT FT1
Pt resting in stretcher A&Ox4, ambulatory No acute distress noted. RR equal and unlabored. Care plan continued. Comfort measures provided. Safety maintained.

## 2024-06-22 NOTE — ED PROVIDER NOTE - PATIENT PORTAL LINK FT
You can access the FollowMyHealth Patient Portal offered by [facility] by registering at the following website: [email or web address]. By joining [employer]’s FollowMyHealth portal, you will also be able to view your health information using other applications (apps) compatible with our system.

## 2024-06-22 NOTE — ED PROVIDER NOTE - ATTENDING CONTRIBUTION TO CARE
Patient mildly ill-appearing pale uncomfortable looking   pupils equal round and reactive to light full extraocular motion cranial nerves II through XII intact mucous membranes moist   neck supple no JVD   heart sounds S1-S2 lungs clear   abdomen soft nontender extremities no edema

## 2024-06-22 NOTE — ED ADULT NURSE NOTE - NSFALLHARMRISKINTERV_ED_ALL_ED

## 2024-06-22 NOTE — ED ADULT TRIAGE NOTE - GLASGOW COMA SCALE: BEST MOTOR RESPONSE, MLM
(M6) obeys commands Minocycline Counseling: Patient advised regarding possible photosensitivity and discoloration of the teeth, skin, lips, tongue and gums.  Patient instructed to avoid sunlight, if possible.  When exposed to sunlight, patients should wear protective clothing, sunglasses, and sunscreen.  The patient was instructed to call the office immediately if the following severe adverse effects occur:  hearing changes, easy bruising/bleeding, severe headache, or vision changes.  The patient verbalized understanding of the proper use and possible adverse effects of minocycline.  All of the patient's questions and concerns were addressed.

## 2024-06-22 NOTE — ED ADULT NURSE NOTE - OBJECTIVE STATEMENT
Break RN: 28up female received in intake room 7. pt A&OX4, , approx 9 weeks pregnant LMP , c/o room spinning dizziness and nausea worsened with movement and headache x 1 day. Also endorses vaginal spotting. Pt had IUP confirmed on US 2 weeks ago. 20G IV placed to LAC, lab drawn and sent. Side rails up, bed at lowest position, call bell within reach, patient oriented to the unit, safety maintained. Medicated as per ordered. Pending US.

## 2024-06-22 NOTE — ED ADULT TRIAGE NOTE - CHIEF COMPLAINT QUOTE
pt 9 weeks pregnant appears weak. c/o nausea, dizziness, headache x 2 days. past med hx pre-eclampsia, D&C, anemia fs= 97

## 2024-06-22 NOTE — ED PROVIDER NOTE - NSFOLLOWUPINSTRUCTIONS_ED_ALL_ED_FT
Rest, stay hydrated, take all meds as previously prescribed, Followup with your OBGYN within 2 days for post hospital visit. Show your doctor and specialists all copies of labs given to you. Return for worsening symptoms, ex. fever, shortness of breath, chest pain, dizziness, palpitations, etc. Please read all the patient handouts.

## 2024-07-17 ENCOUNTER — APPOINTMENT (OUTPATIENT)
Dept: ANTEPARTUM | Facility: CLINIC | Age: 29
End: 2024-07-17

## 2024-07-17 ENCOUNTER — ASOB RESULT (OUTPATIENT)
Age: 29
End: 2024-07-17

## 2024-07-17 PROCEDURE — 76801 OB US < 14 WKS SINGLE FETUS: CPT | Mod: 59

## 2024-07-17 PROCEDURE — 76813 OB US NUCHAL MEAS 1 GEST: CPT

## 2024-09-12 ENCOUNTER — ASOB RESULT (OUTPATIENT)
Age: 29
End: 2024-09-12

## 2024-09-12 ENCOUNTER — APPOINTMENT (OUTPATIENT)
Dept: ANTEPARTUM | Facility: CLINIC | Age: 29
End: 2024-09-12

## 2024-09-12 PROCEDURE — 76811 OB US DETAILED SNGL FETUS: CPT

## 2024-10-10 ENCOUNTER — APPOINTMENT (OUTPATIENT)
Dept: ANTEPARTUM | Facility: CLINIC | Age: 29
End: 2024-10-10

## 2024-10-24 ENCOUNTER — APPOINTMENT (OUTPATIENT)
Dept: ANTEPARTUM | Facility: CLINIC | Age: 29
End: 2024-10-24
Payer: MEDICAID

## 2024-10-24 ENCOUNTER — ASOB RESULT (OUTPATIENT)
Age: 29
End: 2024-10-24

## 2024-10-24 PROCEDURE — 99212 OFFICE O/P EST SF 10 MIN: CPT | Mod: TH,25

## 2024-10-24 PROCEDURE — 76820 UMBILICAL ARTERY ECHO: CPT | Mod: 59

## 2024-10-24 PROCEDURE — 76816 OB US FOLLOW-UP PER FETUS: CPT

## 2024-10-30 ENCOUNTER — APPOINTMENT (OUTPATIENT)
Dept: ANTEPARTUM | Facility: CLINIC | Age: 29
End: 2024-10-30

## 2024-11-21 ENCOUNTER — ASOB RESULT (OUTPATIENT)
Age: 29
End: 2024-11-21

## 2024-11-21 ENCOUNTER — APPOINTMENT (OUTPATIENT)
Dept: ANTEPARTUM | Facility: CLINIC | Age: 29
End: 2024-11-21
Payer: MEDICAID

## 2024-11-21 PROCEDURE — 76816 OB US FOLLOW-UP PER FETUS: CPT

## 2024-11-21 PROCEDURE — 76820 UMBILICAL ARTERY ECHO: CPT | Mod: 59

## 2024-12-12 ENCOUNTER — APPOINTMENT (OUTPATIENT)
Dept: ANTEPARTUM | Facility: CLINIC | Age: 29
End: 2024-12-12

## 2024-12-12 ENCOUNTER — OUTPATIENT (OUTPATIENT)
Dept: INPATIENT UNIT | Facility: HOSPITAL | Age: 29
LOS: 1 days | Discharge: HOME | End: 2024-12-12
Payer: MEDICAID

## 2024-12-12 VITALS
RESPIRATION RATE: 15 BRPM | TEMPERATURE: 97 F | DIASTOLIC BLOOD PRESSURE: 66 MMHG | SYSTOLIC BLOOD PRESSURE: 107 MMHG | HEART RATE: 89 BPM

## 2024-12-12 VITALS — DIASTOLIC BLOOD PRESSURE: 69 MMHG | HEART RATE: 85 BPM | SYSTOLIC BLOOD PRESSURE: 110 MMHG

## 2024-12-12 DIAGNOSIS — Z3A.34 34 WEEKS GESTATION OF PREGNANCY: ICD-10-CM

## 2024-12-12 DIAGNOSIS — O34.219 MATERNAL CARE FOR UNSPECIFIED TYPE SCAR FROM PREVIOUS CESAREAN DELIVERY: ICD-10-CM

## 2024-12-12 DIAGNOSIS — O36.5930 MATERNAL CARE FOR OTHER KNOWN OR SUSPECTED POOR FETAL GROWTH, THIRD TRIMESTER, NOT APPLICABLE OR UNSPECIFIED: ICD-10-CM

## 2024-12-12 DIAGNOSIS — O36.8330 MATERNAL CARE FOR ABNORMALITIES OF THE FETAL HEART RATE OR RHYTHM, THIRD TRIMESTER, NOT APPLICABLE OR UNSPECIFIED: ICD-10-CM

## 2024-12-12 DIAGNOSIS — O26.899 OTHER SPECIFIED PREGNANCY RELATED CONDITIONS, UNSPECIFIED TRIMESTER: ICD-10-CM

## 2024-12-12 DIAGNOSIS — Z98.891 HISTORY OF UTERINE SCAR FROM PREVIOUS SURGERY: Chronic | ICD-10-CM

## 2024-12-12 PROCEDURE — 59025 FETAL NON-STRESS TEST: CPT | Mod: 26

## 2024-12-12 PROCEDURE — 99221 1ST HOSP IP/OBS SF/LOW 40: CPT | Mod: 25

## 2024-12-19 ENCOUNTER — APPOINTMENT (OUTPATIENT)
Dept: ANTEPARTUM | Facility: CLINIC | Age: 29
End: 2024-12-19

## 2024-12-19 ENCOUNTER — ASOB RESULT (OUTPATIENT)
Age: 29
End: 2024-12-19

## 2024-12-19 PROCEDURE — 76819 FETAL BIOPHYS PROFIL W/O NST: CPT | Mod: 59

## 2024-12-19 PROCEDURE — 76820 UMBILICAL ARTERY ECHO: CPT | Mod: 59

## 2024-12-19 PROCEDURE — 76816 OB US FOLLOW-UP PER FETUS: CPT

## 2024-12-20 ENCOUNTER — ASOB RESULT (OUTPATIENT)
Age: 29
End: 2024-12-20

## 2024-12-20 ENCOUNTER — OUTPATIENT (OUTPATIENT)
Dept: INPATIENT UNIT | Facility: HOSPITAL | Age: 29
LOS: 1 days | Discharge: HOME | End: 2024-12-20
Payer: MEDICAID

## 2024-12-20 ENCOUNTER — APPOINTMENT (OUTPATIENT)
Dept: ANTEPARTUM | Facility: CLINIC | Age: 29
End: 2024-12-20
Payer: MEDICAID

## 2024-12-20 VITALS
HEART RATE: 93 BPM | RESPIRATION RATE: 16 BRPM | DIASTOLIC BLOOD PRESSURE: 76 MMHG | SYSTOLIC BLOOD PRESSURE: 121 MMHG | TEMPERATURE: 98 F

## 2024-12-20 VITALS — DIASTOLIC BLOOD PRESSURE: 64 MMHG | HEART RATE: 76 BPM | SYSTOLIC BLOOD PRESSURE: 98 MMHG

## 2024-12-20 DIAGNOSIS — Z98.891 HISTORY OF UTERINE SCAR FROM PREVIOUS SURGERY: Chronic | ICD-10-CM

## 2024-12-20 LAB
ALBUMIN SERPL ELPH-MCNC: 3.5 G/DL — SIGNIFICANT CHANGE UP (ref 3.3–5)
ALP SERPL-CCNC: 279 U/L — HIGH (ref 40–120)
ALT FLD-CCNC: 19 U/L — SIGNIFICANT CHANGE UP (ref 4–33)
ANION GAP SERPL CALC-SCNC: 13 MMOL/L — SIGNIFICANT CHANGE UP (ref 7–14)
APPEARANCE UR: ABNORMAL
AST SERPL-CCNC: 21 U/L — SIGNIFICANT CHANGE UP (ref 4–32)
BACTERIA # UR AUTO: ABNORMAL /HPF
BASOPHILS # BLD AUTO: 0.01 K/UL — SIGNIFICANT CHANGE UP (ref 0–0.2)
BASOPHILS NFR BLD AUTO: 0.1 % — SIGNIFICANT CHANGE UP (ref 0–2)
BILIRUB SERPL-MCNC: 0.4 MG/DL — SIGNIFICANT CHANGE UP (ref 0.2–1.2)
BILIRUB UR-MCNC: NEGATIVE — SIGNIFICANT CHANGE UP
BUN SERPL-MCNC: 7 MG/DL — SIGNIFICANT CHANGE UP (ref 7–23)
CALCIUM SERPL-MCNC: 9.1 MG/DL — SIGNIFICANT CHANGE UP (ref 8.4–10.5)
CAST: 1 /LPF — SIGNIFICANT CHANGE UP (ref 0–4)
CHLORIDE SERPL-SCNC: 103 MMOL/L — SIGNIFICANT CHANGE UP (ref 98–107)
CO2 SERPL-SCNC: 20 MMOL/L — LOW (ref 22–31)
COLOR SPEC: YELLOW — SIGNIFICANT CHANGE UP
CREAT ?TM UR-MCNC: 100 MG/DL — SIGNIFICANT CHANGE UP
CREAT SERPL-MCNC: 0.37 MG/DL — LOW (ref 0.5–1.3)
DIFF PNL FLD: NEGATIVE — SIGNIFICANT CHANGE UP
EGFR: 139 ML/MIN/1.73M2 — SIGNIFICANT CHANGE UP
EOSINOPHIL # BLD AUTO: 0.07 K/UL — SIGNIFICANT CHANGE UP (ref 0–0.5)
EOSINOPHIL NFR BLD AUTO: 0.9 % — SIGNIFICANT CHANGE UP (ref 0–6)
GLUCOSE SERPL-MCNC: 111 MG/DL — HIGH (ref 70–99)
GLUCOSE UR QL: NEGATIVE MG/DL — SIGNIFICANT CHANGE UP
HCT VFR BLD CALC: 36.5 % — SIGNIFICANT CHANGE UP (ref 34.5–45)
HGB BLD-MCNC: 12.4 G/DL — SIGNIFICANT CHANGE UP (ref 11.5–15.5)
IANC: 4.78 K/UL — SIGNIFICANT CHANGE UP (ref 1.8–7.4)
IMM GRANULOCYTES NFR BLD AUTO: 0.5 % — SIGNIFICANT CHANGE UP (ref 0–0.9)
KETONES UR-MCNC: NEGATIVE MG/DL — SIGNIFICANT CHANGE UP
LDH SERPL L TO P-CCNC: 139 U/L — SIGNIFICANT CHANGE UP (ref 135–225)
LEUKOCYTE ESTERASE UR-ACNC: ABNORMAL
LYMPHOCYTES # BLD AUTO: 2.26 K/UL — SIGNIFICANT CHANGE UP (ref 1–3.3)
LYMPHOCYTES # BLD AUTO: 29.2 % — SIGNIFICANT CHANGE UP (ref 13–44)
MCHC RBC-ENTMCNC: 28.8 PG — SIGNIFICANT CHANGE UP (ref 27–34)
MCHC RBC-ENTMCNC: 34 G/DL — SIGNIFICANT CHANGE UP (ref 32–36)
MCV RBC AUTO: 84.9 FL — SIGNIFICANT CHANGE UP (ref 80–100)
MONOCYTES # BLD AUTO: 0.59 K/UL — SIGNIFICANT CHANGE UP (ref 0–0.9)
MONOCYTES NFR BLD AUTO: 7.6 % — SIGNIFICANT CHANGE UP (ref 2–14)
NEUTROPHILS # BLD AUTO: 4.78 K/UL — SIGNIFICANT CHANGE UP (ref 1.8–7.4)
NEUTROPHILS NFR BLD AUTO: 61.7 % — SIGNIFICANT CHANGE UP (ref 43–77)
NITRITE UR-MCNC: NEGATIVE — SIGNIFICANT CHANGE UP
NRBC # BLD: 0 /100 WBCS — SIGNIFICANT CHANGE UP (ref 0–0)
NRBC # FLD: 0 K/UL — SIGNIFICANT CHANGE UP (ref 0–0)
PH UR: 7 — SIGNIFICANT CHANGE UP (ref 5–8)
PLATELET # BLD AUTO: 178 K/UL — SIGNIFICANT CHANGE UP (ref 150–400)
POTASSIUM SERPL-MCNC: 3.4 MMOL/L — LOW (ref 3.5–5.3)
POTASSIUM SERPL-SCNC: 3.4 MMOL/L — LOW (ref 3.5–5.3)
PROT ?TM UR-MCNC: 24 MG/DL — SIGNIFICANT CHANGE UP
PROT SERPL-MCNC: 6.5 G/DL — SIGNIFICANT CHANGE UP (ref 6–8.3)
PROT UR-MCNC: 30 MG/DL
PROT/CREAT UR-RTO: 0.2 RATIO — SIGNIFICANT CHANGE UP (ref 0–0.2)
RBC # BLD: 4.3 M/UL — SIGNIFICANT CHANGE UP (ref 3.8–5.2)
RBC # FLD: 13.5 % — SIGNIFICANT CHANGE UP (ref 10.3–14.5)
RBC CASTS # UR COMP ASSIST: 1 /HPF — SIGNIFICANT CHANGE UP (ref 0–4)
SODIUM SERPL-SCNC: 136 MMOL/L — SIGNIFICANT CHANGE UP (ref 135–145)
SP GR SPEC: 1.02 — SIGNIFICANT CHANGE UP (ref 1–1.03)
SQUAMOUS # UR AUTO: 17 /HPF — HIGH (ref 0–5)
URATE SERPL-MCNC: 4.2 MG/DL — SIGNIFICANT CHANGE UP (ref 2.5–7)
UROBILINOGEN FLD QL: 0.2 MG/DL — SIGNIFICANT CHANGE UP (ref 0.2–1)
WBC # BLD: 7.75 K/UL — SIGNIFICANT CHANGE UP (ref 3.8–10.5)
WBC # FLD AUTO: 7.75 K/UL — SIGNIFICANT CHANGE UP (ref 3.8–10.5)
WBC UR QL: 26 /HPF — HIGH (ref 0–5)

## 2024-12-20 PROCEDURE — 99221 1ST HOSP IP/OBS SF/LOW 40: CPT | Mod: 25

## 2024-12-20 PROCEDURE — 59025 FETAL NON-STRESS TEST: CPT | Mod: 26

## 2024-12-20 PROCEDURE — 76819 FETAL BIOPHYS PROFIL W/O NST: CPT | Mod: 26

## 2024-12-20 NOTE — OB RN TRIAGE NOTE - CURRENT PREGNANCY COMPLICATIONS, OB PROFILE
Intrauterine Growth Restriction/Maternal Positive GBS Schd C/S on 01/14/Intrauterine Growth Restriction/Maternal Positive GBS

## 2024-12-20 NOTE — OB PROVIDER TRIAGE NOTE - NSHPLABSRESULTS_GEN_ALL_CORE
CBC Full  -  ( 20 Dec 2024 01:30 )  WBC Count : 7.75 K/uL  RBC Count : 4.30 M/uL  Hemoglobin : 12.4 g/dL  Hematocrit : 36.5 %  Platelet Count - Automated : 178 K/uL  Mean Cell Volume : 84.9 fL  Mean Cell Hemoglobin : 28.8 pg  Mean Cell Hemoglobin Concentration : 34.0 g/dL  Auto Neutrophil # : 4.78 K/uL  Auto Lymphocyte # : 2.26 K/uL  Auto Monocyte # : 0.59 K/uL  Auto Eosinophil # : 0.07 K/uL  Auto Basophil # : 0.01 K/uL  Auto Neutrophil % : 61.7 %  Auto Lymphocyte % : 29.2 %  Auto Monocyte % : 7.6 %  Auto Eosinophil % : 0.9 %  Auto Basophil % : 0.1 %    12-20-24 @ 01:30    136  |  103  |  7             --------------------------< 111[H]     3.4[L]  |  20[L]  | 0.37[L]    eGFR AA: --  eGFR N-AA: --    Calcium: 9.1  Phosphorus: --  Magnesium: --    AST: 21    ALT: 19  AlkPhos: 279[H]  Protein: 6.5  Albumin: 3.5  TBili: 0.4  D-Bili: --

## 2024-12-20 NOTE — OB PROVIDER TRIAGE NOTE - ADDITIONAL INSTRUCTIONS
Follow up at next scheduled prenatal appointment 12/23/2024  Return for decreased fetal movement, loss of fluid or vaginal bleeding  Increase oral hydration  Signs and Symptoms of Labor reviewed   Kick Counts Reviewed   Continue to monitor blood pressure as directed  Return to hospital with elevated pressure >160 and or >110  Call Office for elevated >140 and or >90  Signs and symptoms of Pre Eclampsia reviewed

## 2024-12-20 NOTE — OB PROVIDER TRIAGE NOTE - NS_FINALEDD_OBGYN_ALL_OB_DT
Quality 110: Preventive Care And Screening: Influenza Immunization: Influenza Immunization Administered during Influenza season Detail Level: Detailed 19-Jan-2025

## 2024-12-20 NOTE — OB RN TRIAGE NOTE - NS_OBGYNHISTORY_OBGYN_ALL_OB_FT
2023 c/s nella@37wks, pph w/transfusion, f 4#9 ft 2023 c/s nella@37wks, PPH  w/transfusion, f 4#9   TOPX1 with no D&C

## 2024-12-20 NOTE — OB PROVIDER TRIAGE NOTE - PLAN OF CARE
D/C Home  D/W Dr. Shah  No evidence of hypertensive disorder at this time   Normal fetal testing   Follow up at next scheduled prenatal appointment 12/23/2024  Return for decreased fetal movement, loss of fluid or vaginal bleeding  Increase oral hydration  Signs and Symptoms of Labor reviewed   Kick Counts Reviewed   Continue to monitor blood pressure as directed  Return to hospital with elevated pressure >160 and or >110  Call Office for elevated >140 and or >90  Signs and symptoms of Pre Eclampsia reviewed

## 2024-12-20 NOTE — OB RN TRIAGE NOTE - FALL HARM RISK - UNIVERSAL INTERVENTIONS
Bed in lowest position, wheels locked, appropriate side rails in place/Call bell, personal items and telephone in reach/Instruct patient to call for assistance before getting out of bed or chair/Non-slip footwear when patient is out of bed/[place] to call system/Physically safe environment - no spills, clutter or unnecessary equipment/Purposeful Proactive Rounding/Room/bathroom lighting operational, light cord in reach

## 2024-12-20 NOTE — OB PROVIDER TRIAGE NOTE - NS_OBGYNHISTORY_OBGYN_ALL_OB_FT
GYN: Maxies  OB:   4/26/2023, Primary C/S for breech presentation @37wks, PPH  w/transfusion, female,  4#9   TOPX1 with no D&C      AP course complicated by  #gHTN  #Scheduled for repeat c/s on 1/14/2025  #SGA 4lbs

## 2024-12-20 NOTE — OB RN TRIAGE NOTE - LIVING CHILDREN, OB PROFILE
1 Cheiloplasty (Less Than 50%) Text: A decision was made to reconstruct the defect with a  cheiloplasty.  The defect was undermined extensively.  Additional obicularis oris muscle was excised with a 15 blade scalpel.  The defect was converted into a full thickness wedge, of less than 50% of the vertical height of the lip, to facilite a better cosmetic result.  Small vessels were then tied off with 5-0 monocyrl. The obicularis oris, superficial fascia, adipose and dermis were then reapproximated.  After the deeper layers were approximated the epidermis was reapproximated with particular care given to realign the vermilion border.

## 2024-12-20 NOTE — OB RN TRIAGE NOTE - CHIEF COMPLAINT QUOTE
"I was told by my doctor to check for pre-eclampsia if I get high blood pressures at home, it was 150/90 at midnight"

## 2024-12-20 NOTE — OB PROVIDER TRIAGE NOTE - NSHPPHYSICALEXAM_GEN_ALL_CORE
Vital Signs Last 24 Hrs  T(C): 36.6 (20 Dec 2024 01:26), Max: 36.6 (20 Dec 2024 00:56)  T(F): 97.88 (20 Dec 2024 01:26), Max: 97.9 (20 Dec 2024 00:56)  HR: 81 (20 Dec 2024 01:54) (81 - 93)  BP: 112/66 (20 Dec 2024 01:54) (112/66 - 121/76)  RR: 14 (20 Dec 2024 01:26) (14 - 16)    Assessment reveals VSS  General: Female sitting comfortably in no apparent distress  Neuro: No facial asymmetry, no slurred speech, moves all 4 extremities  Mood: Alert and lucid, appropriate mood and affect  A&Ox3  Lungs- clear bilateral  Heart- normal rate and regular rhythm  Extremities- Warm, Dry, no edema present, good pulses   Abdomen soft, NT, gravid  Cat 1 tracing reactive, ctx every 5 mins  Transabdominal Ultrasound- images saved ASOB  Sterile Speculum-  Transvaginal Ultrasound-  Vaginal Exam-         PLAN: Vital Signs Last 24 Hrs  T(C): 36.6 (20 Dec 2024 01:26), Max: 36.6 (20 Dec 2024 00:56)  T(F): 97.88 (20 Dec 2024 01:26), Max: 97.9 (20 Dec 2024 00:56)  HR: 81 (20 Dec 2024 01:54) (81 - 93)  BP: 112/66 (20 Dec 2024 01:54) (112/66 - 121/76)  RR: 14 (20 Dec 2024 01:26) (14 - 16)    Assessment reveals VSS  General: Female sitting comfortably in no apparent distress  Neuro: No facial asymmetry, no slurred speech, moves all 4 extremities  Mood: Alert and lucid, appropriate mood and affect  A&Ox3  Lungs- clear bilateral  Heart- normal rate and regular rhythm  Extremities- Warm, Dry, no edema present, good pulses   Abdomen soft, NT, gravid  Cat 1 tracing reactive, occasional ctx  Transabdominal Ultrasound- images saved ASOB      PLAN:  HELLP labs  Serial BPs Vital Signs Last 24 Hrs  T(C): 36.6 (20 Dec 2024 01:26), Max: 36.6 (20 Dec 2024 00:56)  T(F): 97.88 (20 Dec 2024 01:26), Max: 97.9 (20 Dec 2024 00:56)  HR: 81 (20 Dec 2024 01:54) (81 - 93)  BP: 112/66 (20 Dec 2024 01:54) (112/66 - 121/76)  RR: 14 (20 Dec 2024 01:26) (14 - 16)    Assessment reveals VSS  General: Female sitting comfortably in no apparent distress  Neuro: No facial asymmetry, no slurred speech, moves all 4 extremities  Mood: Alert and lucid, appropriate mood and affect  A&Ox3  Lungs- clear bilateral  Heart- normal rate and regular rhythm  Extremities- Warm, Dry, no edema present, good pulses   Abdomen soft, NT, gravid  Cat 1 tracing reactive, occasional ctx  Transabdominal Ultrasound- images saved ASOB, vtx, post placenta, bpp8/8, [place]:12.51      PLAN:  HELLP labs  Serial BPs

## 2024-12-20 NOTE — OB PROVIDER TRIAGE NOTE - HISTORY OF PRESENT ILLNESS
28y/o  @35.5wks presents with elevated blood pressure at home at midnight, 150/90. Patient is a known gestational hypertensive.   Reports good fetal movement  Denies LOF/VB    Allergies: Denies  Medications: PNV, Vit D   28y/o  @35.5wks presents with elevated blood pressure at home at 10pm of 150/90. Patient states she had one elevated bp in office 2 days ago and was advised to monitor pressures at home. Denies SOB, epigastric, HA or blurry vision   Reports good fetal movement  Denies LOF/VB    Allergies: Denies  Medications: PNV, Vit D  NPO [id number]

## 2024-12-20 NOTE — OB PROVIDER TRIAGE NOTE - NS_CTXBYPALP_OBGYN_ALL_OB
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 Hypertension  The history is provided by the patient. This is a chronic problem. The current episode started more than 1 week ago. The problem occurs every several days. The problem has not changed since onset. Shortness of Breath  The history is provided by the patient. This is a chronic problem. The problem occurs intermittently. The current episode started more than 1 week ago. The problem has been gradually improving. Pertinent negatives include no fever, no ear pain, no neck pain, no cough, no sputum production, no hemoptysis, no wheezing, no PND, no orthopnea, no syncope, no vomiting, no rash, no leg pain, no leg swelling and no claudication. Associated medical issues do not include chronic lung disease, CAD or heart failure. Review of Systems   Constitutional: Negative for chills, diaphoresis, fever, malaise/fatigue and weight loss. HENT: Negative for ear discharge, ear pain, hearing loss, nosebleeds and tinnitus. Eyes: Negative for blurred vision. Respiratory: Negative for cough, hemoptysis, sputum production, wheezing and stridor. Cardiovascular: Negative for palpitations, orthopnea, claudication, leg swelling, syncope and PND. Gastrointestinal: Negative for heartburn, nausea and vomiting. Musculoskeletal: Negative for myalgias and neck pain. Skin: Negative for itching and rash. Neurological: Negative for dizziness, tingling, tremors, focal weakness, loss of consciousness and weakness. Psychiatric/Behavioral: Negative for depression and suicidal ideas.      Family History   Problem Relation Age of Onset    Hypertension Mother     Thyroid Disease Mother     Hypertension Father     Diabetes Father     Drug Abuse Brother        Past Medical History:   Diagnosis Date    Diabetes ([facility] [address] 1995    Dyslipidemia 3/1/2017    Fractures 1995    R Knee/ Tib fib fracture/surgery    Gout 2010    Graves disease 2015    High cholesterol     HTN (hypertension)     Osteoarthritis     Severe aortic stenosis 3/29/2017    Spinal stenosis of lumbar region 3/1/2016    Spondylolisthesis 3/1/2016    Thyroid trouble     Type 2 diabetes mellitus with diabetic nephropathy ([facility] [address] 2015       Past Surgical History:   Procedure Laterality Date    HX HIP REPLACEMENT Left 2009    HX LUMBAR FUSION      HX ORTHOPAEDIC Right     R knee/Tibfib surgery       Social History     Tobacco Use    Smoking status: Former Smoker     Packs/day: 1.00     Types: Cigarettes     Start date: 1969     Quit date: 1995     Years since quittin.4    Smokeless tobacco: Never Used   Substance Use Topics    Alcohol use: No       Allergies   Allergen Reactions    Watermelon Anaphylaxis and Swelling    Citric Acid Rash    Peach (Prunus Persica) Itching       Outpatient Medications Marked as Taking for the 21 encounter (Office Visit) with Bryan Springer MD   Medication Sig Dispense Refill    metoprolol tartrate (LOPRESSOR) 25 mg tablet Take 12.5 mg by mouth two (2) times a day.  rosuvastatin (Crestor) 20 mg tablet Take 20 mg by mouth nightly.  montelukast (SINGULAIR) 10 mg tablet Take 1 Tab by mouth daily. 90 Tab 1    fluticasone propionate (FLONASE) 50 mcg/actuation nasal spray 2 Sprays by Both Nostrils route daily. 1 Bottle 1    furosemide (LASIX) 20 mg tablet Take 1 Tab by mouth daily. 30 Tab 0    levothyroxine (SYNTHROID) 150 mcg tablet Take 1 Tab by mouth Daily (before breakfast). (Patient taking differently: Take 125 mcg by mouth Daily (before breakfast). ) 30 Tab 2    spironolactone (ALDACTONE) 25 mg tablet Take 1 Tab by mouth daily.  b complex vitamins tablet Take 1 Tab by mouth daily.  Alpha Lipoic Acid 600 mg cap Take  by mouth.  aspirin (ASPIRIN) 325 mg tablet Take 325 mg by mouth daily.  ascorbic acid, vitamin C, (VITAMIN C) 250 mg tablet Take  by mouth.       DULoxetine (CYMBALTA) 60 mg capsule Take 1 Cap by mouth daily. 30 Cap 1    capsicum oleoresin 0.025 % topical cream Apply  to affected area three (3) times daily.  diclofenac (VOLTAREN) 1 % gel Apply  to affected area four (4) times daily.  magnesium chloride (MAG DELAY) 64 mg delayed release tablet Take 8 tablets 3 x day      amLODIPine (NORVASC) 10 mg tablet Take 5 mg by mouth daily.  insulin glargine (LANTUS) 100 unit/mL injection 40 Units. Take 30 units once daily  Indications: type 2 diabetes mellitus 3 Vial 3    febuxostat (ULORIC) 40 mg tab tablet Take 1 Tab by mouth daily. Indications: GOUT PREVENTION 90 Tab 1    insulin aspart (NOVOLOG) 100 unit/mL injection by SubCUTAneous route. 6 units before meals      glucose blood VI test strips (ASCENSIA AUTODISC VI, ONE TOUCH ULTRA TEST VI) strip Dispense precision extra test strips 3 x day to check blood glucose 200 Each 3    Omeprazole delayed release (PRILOSEC D/R) 20 mg tablet Take 20 mg by mouth daily.  atorvastatin (LIPITOR) 40 mg tablet Take 20 mg by mouth daily.  testosterone (ANDROGEL) 20.25 mg/1.25 gram (1.62 %) gel Apply  to affected area as needed.  Cholecalciferol, Vitamin D3, 1,000 unit cap Take 3,000 Units by mouth daily. Visit Vitals  /63   Pulse (!) 53   Ht 5' 10\" (1.778 m)   Wt 122 kg (269 lb)   BMI 38.60 kg/m²     Physical Exam  Constitutional:       General: He is not in acute distress. Appearance: He is well-developed. He is not diaphoretic. HENT:      Head: Atraumatic. Mouth/Throat:      Pharynx: No oropharyngeal exudate. Eyes:      General: No scleral icterus. Right eye: No discharge. Left eye: No discharge. Conjunctiva/sclera: Conjunctivae normal.   Neck:      Thyroid: No thyromegaly. Vascular: No JVD. Trachea: No tracheal deviation. Cardiovascular:      Rate and Rhythm: Normal rate and regular rhythm. Heart sounds: No murmur heard.    No trae. Comments: Surgical sternal scar  Pulmonary:      Effort: Pulmonary effort is normal. No respiratory distress. Breath sounds: Normal breath sounds. No stridor. No wheezing or rales. Chest:      Chest wall: No tenderness. Abdominal:      Palpations: Abdomen is soft. Tenderness: There is no abdominal tenderness. There is no guarding or rebound. Musculoskeletal:         General: No tenderness. Normal range of motion. Cervical back: Normal range of motion and neck supple. Lymphadenopathy:      Cervical: No cervical adenopathy. Skin:     General: Skin is warm. Neurological:      Mental Status: He is alert and oriented to person, place, and time. Motor: No abnormal muscle tone. Psychiatric:         Behavior: Behavior normal.       ekg sinus rhythm with PVC, no acute st-t changes  Echo 02/2017:  SUMMARY:  Left ventricle: Systolic function was normal by visual assessment. Ejection fraction was estimated in the range of 60 % to 65 %. No obvious  wall motion abnormalities identified in the views obtained. Wall thickness  was at the upper limits of normal.    Aortic valve: Leaflets exhibited markedly increased thickness and reduced  mobility. There was moderate to severe stenosis. Valve peak gradient was  61 mmHg. Valve mean gradient was 39 mmHg. Estimated aortic valve area (by  VTI) was 0.5 cmï¾². ZACKERY likely overestimates severity of aortic stenosis due  to difficulty in measuring LVOT diameter. Aorta, systemic arteries: The root exhibited dilatation. 02/17/20   ECHO ADULT COMPLETE 02/19/2020 2/19/2020    Narrative · Normal cavity size and systolic function (ejection fraction normal). Mild concentric hypertrophy. Estimated left ventricular ejection fraction   is 55 - 60%. No regional wall motion abnormality noted. Mild (grade 1)   left ventricular diastolic dysfunction. · Mildly dilated right atrium. · There is a 25 mm St. Judes bioprosthetic aortic valve.  Prosthesis is normal.  · Mitral valve thickening. Mild mitral valve regurgitation is present. · Mild tricuspid valve regurgitation is present. · There is no evidence of pulmonary hypertension. · Mild aortic root dilatation. Signed by: Rafy Snell MD   04/06/21    ECHO ADULT COMPLETE 04/06/2021 4/6/2021    Interpretation Summary  · LV: Estimated LVEF is 55 - 60%. Normal cavity size and systolic function (ejection fraction normal). Mild concentric hypertrophy. Wall motion: normal. Mild (grade 1) left ventricular diastolic dysfunction. · LA: Mildly dilated left atrium. Left Atrium volume index is 38.76 mL/m2. · RV: Mildly dilated right ventricle. · RA: Mildly dilated right atrium. · AV: Prosthetic aortic valve. Aortic valve mean gradient is 8.1 mmHg. There is a 25 mm St. Judes bioprosthetic aortic valve. Prosthesis is normal.  · MV: Mitral annular calcification. Mild mitral valve regurgitation is present. · TV: Mild tricuspid valve regurgitation is present. · AO: Mild ascending aorta dilatation. Ascending aorta diameter = 3.7 cm. · PA: Pulmonary arterial systolic pressure is 26 mmHg. Signed by: Rafy Snell MD on 4/6/2021 12:26 PM    ASSESSMENT and PLAN    ICD-10-CM ICD-9-CM    1. s/p AVR  R01.1 785.2    2. Dyslipidemia  E78.5 272.4    3. Type 2 diabetes mellitus with diabetic nephropathy, unspecified whether long term insulin use (HCC)  E11.21 250.40      583.81    4. Stage 3 chronic kidney disease, unspecified whether stage 3a or 3b CKD ([facility])  N18.30 585.3    5. Hypothyroidism due to acquired atrophy of thyroid  E03.4 244.8      246.8    6. Essential hypertension  I10 401.9      No orders of the defined types were placed in this encounter. Follow-up and Dispositions    · Return in about 6 months (around 11/19/2021). reviewed diet, exercise and weight control  cardiovascular risk and specific lipid/LDL goals reviewed  use of aspirin to prevent MI and TIA's discussed.     Now S/p AVR. Echo report reviewed- normal valve function. Remains asymptomatic from cardiac standpoint  Effort tolerance is stable. Continue current meds. Mild/Good Relaxation between Contractions

## 2024-12-23 NOTE — ED PROVIDER NOTE - IV ALTEPLASE INCLUSION HIDDEN
Betty Mendieta is a 66 y.o. female who presents for zometa infusion.      Since her last visit, she has been doing well.  Overall, she states her energy level is stable.  Her appetite has been unchanged and good.  She reports no complaints.     Pt tolerated infusion well without incident. Pt discharged home in stable condition.   
show

## 2024-12-25 NOTE — OB PROVIDER TRIAGE NOTE - HISTORY OF PRESENT ILLNESS
29y  at 34w4d sent to triage by MFM for monitoring due to non reactive NST. Pt is being followed for SGA Fetus. Pt has no complaints.    Reports +FM, no vaginal bleeding, no ROM or LOF  [facility]  Pt with h/o prior  Delivery for Breech presentation; Scheduled for repeat C/s on 25  + h/o postpartum hemorrhage, received blood transfusion

## 2024-12-25 NOTE — OB PROVIDER TRIAGE NOTE - PLAN OF CARE
D/w Dr Shah  D/c home with instructions   labor precautions  Pt to follow with OB as scheduled next week  Pt instructed on fetal kick counts  Pt instructed to return to triage if decreased fetal movements, having strong, regular contractions, vaginal bleeding or LOF

## 2024-12-25 NOTE — OB PROVIDER TRIAGE NOTE - NSHPPHYSICALEXAM_GEN_ALL_CORE
T(C): 36.3 (12-12-24 @ 18:53), Max: 36.3 (12-12-24 @ 18:35)  HR: 85 (12-12-24 @ 20:43) (85 - 89)  BP: 110/69 (12-12-24 @ 20:43) (107/66 - 112/77)  RR: 16 (12-12-24 @ 18:53) (15 - 16)    Heart: RRR  Lungs: CTA  Abdomen: Gravid, soft, NT    NST: Baseline 140, Reactive with moderate variability  [place]: No contractions   Tracing reviewed with Dr Shah  Pt had sono done by [facility] today - BPP 8/8

## 2024-12-27 ENCOUNTER — OUTPATIENT (OUTPATIENT)
Dept: OUTPATIENT SERVICES | Facility: HOSPITAL | Age: 29
LOS: 1 days | End: 2024-12-27

## 2024-12-27 VITALS
SYSTOLIC BLOOD PRESSURE: 111 MMHG | TEMPERATURE: 98 F | HEIGHT: 60 IN | RESPIRATION RATE: 16 BRPM | DIASTOLIC BLOOD PRESSURE: 74 MMHG | HEART RATE: 87 BPM | OXYGEN SATURATION: 97 % | WEIGHT: 117.95 LBS

## 2024-12-27 DIAGNOSIS — O34.219 MATERNAL CARE FOR UNSPECIFIED TYPE SCAR FROM PREVIOUS CESAREAN DELIVERY: ICD-10-CM

## 2024-12-27 DIAGNOSIS — O36.5930 MATERNAL CARE FOR OTHER KNOWN OR SUSPECTED POOR FETAL GROWTH, THIRD TRIMESTER, NOT APPLICABLE OR UNSPECIFIED: ICD-10-CM

## 2024-12-27 DIAGNOSIS — O13.3 GESTATIONAL [PREGNANCY-INDUCED] HYPERTENSION WITHOUT SIGNIFICANT PROTEINURIA, THIRD TRIMESTER: ICD-10-CM

## 2024-12-27 DIAGNOSIS — Z3A.35 35 WEEKS GESTATION OF PREGNANCY: ICD-10-CM

## 2024-12-27 DIAGNOSIS — Z87.59 PERSONAL HISTORY OF OTHER COMPLICATIONS OF PREGNANCY, CHILDBIRTH AND THE PUERPERIUM: ICD-10-CM

## 2024-12-27 DIAGNOSIS — Z98.891 HISTORY OF UTERINE SCAR FROM PREVIOUS SURGERY: Chronic | ICD-10-CM

## 2024-12-27 DIAGNOSIS — Z34.90 ENCOUNTER FOR SUPERVISION OF NORMAL PREGNANCY, UNSPECIFIED, UNSPECIFIED TRIMESTER: ICD-10-CM

## 2024-12-27 DIAGNOSIS — O26.899 OTHER SPECIFIED PREGNANCY RELATED CONDITIONS, UNSPECIFIED TRIMESTER: ICD-10-CM

## 2024-12-27 LAB
APPEARANCE UR: ABNORMAL
BASOPHILS # BLD AUTO: 0.02 K/UL — SIGNIFICANT CHANGE UP (ref 0–0.2)
BASOPHILS NFR BLD AUTO: 0.3 % — SIGNIFICANT CHANGE UP (ref 0–2)
BILIRUB UR-MCNC: NEGATIVE — SIGNIFICANT CHANGE UP
COLOR SPEC: YELLOW — SIGNIFICANT CHANGE UP
DIFF PNL FLD: NEGATIVE — SIGNIFICANT CHANGE UP
EOSINOPHIL # BLD AUTO: 0.03 K/UL — SIGNIFICANT CHANGE UP (ref 0–0.5)
EOSINOPHIL NFR BLD AUTO: 0.4 % — SIGNIFICANT CHANGE UP (ref 0–6)
GLUCOSE UR QL: NEGATIVE MG/DL — SIGNIFICANT CHANGE UP
HCT VFR BLD CALC: 38.3 % — SIGNIFICANT CHANGE UP (ref 34.5–45)
HGB BLD-MCNC: 13 G/DL — SIGNIFICANT CHANGE UP (ref 11.5–15.5)
IMM GRANULOCYTES NFR BLD AUTO: 1.3 % — HIGH (ref 0–0.9)
KETONES UR-MCNC: NEGATIVE MG/DL — SIGNIFICANT CHANGE UP
LEUKOCYTE ESTERASE UR-ACNC: ABNORMAL
LYMPHOCYTES # BLD AUTO: 2.14 K/UL — SIGNIFICANT CHANGE UP (ref 1–3.3)
LYMPHOCYTES # BLD AUTO: 27 % — SIGNIFICANT CHANGE UP (ref 13–44)
MCHC RBC-ENTMCNC: 29.4 PG — SIGNIFICANT CHANGE UP (ref 27–34)
MCHC RBC-ENTMCNC: 33.9 G/DL — SIGNIFICANT CHANGE UP (ref 32–36)
MCV RBC AUTO: 86.7 FL — SIGNIFICANT CHANGE UP (ref 80–100)
MONOCYTES # BLD AUTO: 0.48 K/UL — SIGNIFICANT CHANGE UP (ref 0–0.9)
MONOCYTES NFR BLD AUTO: 6.1 % — SIGNIFICANT CHANGE UP (ref 2–14)
NEUTROPHILS # BLD AUTO: 5.15 K/UL — SIGNIFICANT CHANGE UP (ref 1.8–7.4)
NEUTROPHILS NFR BLD AUTO: 64.9 % — SIGNIFICANT CHANGE UP (ref 43–77)
NITRITE UR-MCNC: NEGATIVE — SIGNIFICANT CHANGE UP
PH UR: 6 — SIGNIFICANT CHANGE UP (ref 5–8)
PLATELET # BLD AUTO: 174 K/UL — SIGNIFICANT CHANGE UP (ref 150–400)
PROT UR-MCNC: SIGNIFICANT CHANGE UP MG/DL
RBC # BLD: 4.42 M/UL — SIGNIFICANT CHANGE UP (ref 3.8–5.2)
RBC # FLD: 13.9 % — SIGNIFICANT CHANGE UP (ref 10.3–14.5)
SP GR SPEC: 1.03 — SIGNIFICANT CHANGE UP (ref 1–1.03)
UROBILINOGEN FLD QL: 1 MG/DL — SIGNIFICANT CHANGE UP (ref 0.2–1)
WBC # BLD: 7.92 K/UL — SIGNIFICANT CHANGE UP (ref 3.8–10.5)
WBC # FLD AUTO: 7.92 K/UL — SIGNIFICANT CHANGE UP (ref 3.8–10.5)

## 2024-12-27 RX ORDER — .BETA.-CAROTENE, SODIUM ACETATE, ASCORBIC ACID, CHOLECALCIFEROL, .ALPHA.-TOCOPHEROL ACETATE, DL-, THIAMINE MONONITRATE, RIBOFLAVIN, NIACINAMIDE, PYRIDOXINE HYDROCHLORIDE, FOLIC ACID, CYANOCOBALAMIN, CALCIUM CARBONATE, FERROUS FUMARATE, ZINC OXIDE AND CUPRIC OXIDE 2000; 2000; 120; 400; 22; 1.84; 3; 20; 10; 1; 12; 200; 27; 25; 2 [IU]/1; [IU]/1; MG/1; [IU]/1; MG/1; MG/1; MG/1; MG/1; MG/1; MG/1; UG/1; MG/1; MG/1; MG/1; MG/1
1 TABLET ORAL
Refills: 0 | DISCHARGE

## 2024-12-27 RX ORDER — CHOLECALCIFEROL (VITAMIN D3) 10MCG/0.25
1 DROPS ORAL
Refills: 0 | DISCHARGE

## 2024-12-27 RX ORDER — SODIUM CHLORIDE 9 G/1000ML
1000 INJECTION, SOLUTION INTRAVENOUS
Refills: 0 | Status: DISCONTINUED | OUTPATIENT
Start: 2024-12-31 | End: 2024-12-31

## 2024-12-27 NOTE — OB PST NOTE - NSANTHOSAYNRD_GEN_A_CORE
No. COLLIN screening performed.  STOP BANG Legend: 0-2 = LOW Risk; 3-4 = INTERMEDIATE Risk; 5-8 = HIGH Risk

## 2024-12-27 NOTE — OB PST NOTE - NSHPREVIEWOFSYSTEMS_GEN_ALL_CORE
General: No fever, chills, sweating, anorexia, weight loss or weight gain. No polyphagia, polyurea, polydypsia, malaise, or fatigue    Skin: No rashes, itching, or dryness. No change in size/color of moles. No tumors, brittle nails, pitted nails, or hair loss    Breast: No tenderness, lumps, or nipple discharge      Ophthalmologic: No diplopia, photophobia, lacrimation, blurred Vision , or eye discharge    ENMT Symptoms: No hearing difficulty, ear pain, tinnitus, or vertigo. No sinus symptoms, nasal congestion, nasal   discharge, or nasal obstruction    Respiratory and Thorax: No wheezing, dyspnea, cough, hemoptysis, or pleuritic chest pPain     Cardiovascular: No chest pain, palpitations, dyspnea on exertion, orthopnea, paroxysmal nocturnal dyspnea,   peripheral edema, or claudication    Gastrointestinal: No nausea, vomiting, diarrhea, constipation, change in bowel habits, flatulence, abdominal pain, or melena    Genitourinary/ Pelvis: No hematuria, renal colic, or flank pain.  No urine discoloration, incontinence, dysuria, or urinary hesitancy. Normal urinary frequency. No nocturia, abnormal vaginal bleeding, vaginal discharge, spotting, pelvic pain, or vaginal leakage    Musculoskeletal: + some Low back pain progressive during pregnancy.  No arthralgia, arthritis, joint swelling, muscle cramping, muscle weakness, neck pain, arm pain, or leg pain    Neurological: No transient paralysis, weakness, paresthesias, or seizures. No syncope, tremors, vertigo, loss of sensation, difficulty walking, loss of consciousness, hemiparesis, confusion, or facial palsy    Psychiatric: No suicidal ideation, depression, anxiety, insomnia, memory loss, paranoia, mood swings, agitation, hallucinations, or hyperactivity    Hematology: No gum bleeding, nose bleeding, or skin lumps    Lymphatic: No enlarged or tender lymph nodes. No extremity swelling    Endocrine: No heat or cold intolerance    Immunologic: No recurrent or persistent infections

## 2024-12-27 NOTE — OB PST NOTE - FALL HARM RISK - HARM RISK INTERVENTIONS

## 2024-12-27 NOTE — OB PST NOTE - NSICDXFAMILYHX_GEN_ALL_CORE_FT
FAMILY HISTORY:  Father  Still living? Yes, Estimated age: Age Unknown  FH: coronary artery disease, Age at diagnosis: Age Unknown    Mother  Still living? Yes, Estimated age: Age Unknown  FH: diabetes mellitus, Age at diagnosis: Age Unknown

## 2024-12-27 NOTE — OB PST NOTE - HISTORY OF PRESENT ILLNESS
28 yo pregnant female presents to pre surgical testing.  Pt denies vaginal bleeding, spotting or leakage of fluid.  Pt reports good fetal movement.  Pt denies regular uterine contractions. Pt is scheduled for repeat  section.     h/o c/s for breech. fetal growth restriction  short interval pregnancy. should not tolac  carrier CF, FOB- neg, low BMI, poor weight    Pt reports pre eclampsia during her prior pregnancy in .

## 2024-12-27 NOTE — OB PST NOTE - NSHPPHYSICALEXAM_GEN_ALL_CORE
Constitutional: Well Developed, Well Groomed, Well Nourished, No Distress    Eyes: PERRL, EOMI, conjunctiva clear    Mouth & Gums: Normal, moist    Neck: Supple, no JVD    Back: Normal shape, ROM intact, strength intact, no vertebral tenderness    Respiratory: Airway patent, breath sounds equal, good air movement, respiration non-labored, clear to auscultation bilateral    Cardiovascular:  Regular rate and rhythm    Gastrointestinal: +Gravid abdomen.  Soft, non-tender, non distention, bowel sound normal, no bruit, no rebound tenderness    Extremities: No clubbing, cyanosis, or pedal edema    Vascular:  Radial Pulse normal    Neurological: alert & oriented x 3    Skin: warm and dry, normal color    Lymph Nodes: normal posterior cervical lymph node, normal anterior cervical lymph node    Musculoskeletal: ROM intact, no joint swelling, warmth, or calf tenderness. Normal strength    Psychiatric: normal affect, normal behavior

## 2024-12-27 NOTE — OB PST NOTE - PROBLEM SELECTOR PLAN 1
Pt is scheduled for repeat  section on 24.  Verbal and written pre op instructions reviewed with patient and pt able to verbalize understanding.   Verbal and written instructions given with chlorhexidine wash, pt able to verbalize understanding via teach back method.   CBC UA T&S sent.

## 2024-12-27 NOTE — OB PST NOTE - AVIAN FLU SYMPTOMS
Rx request from [facility] for Hyzaar 100-25mg 1 tablet QD  Last written:04/15/2019 30 tablets with 1 refill  Last seen:04/09/2019, No future appointments  Rx verified,ordered, and set to escribe No

## 2024-12-28 LAB
BLD GP AB SCN SERPL QL: NEGATIVE — SIGNIFICANT CHANGE UP
RH IG SCN BLD-IMP: POSITIVE — SIGNIFICANT CHANGE UP

## 2024-12-31 ENCOUNTER — TRANSCRIPTION ENCOUNTER (OUTPATIENT)
Age: 29
End: 2024-12-31

## 2024-12-31 ENCOUNTER — INPATIENT (INPATIENT)
Facility: HOSPITAL | Age: 29
LOS: 3 days | Discharge: HOME | End: 2025-01-04
Attending: OBSTETRICS & GYNECOLOGY | Admitting: OBSTETRICS & GYNECOLOGY
Payer: MEDICAID

## 2024-12-31 VITALS — DIASTOLIC BLOOD PRESSURE: 80 MMHG | SYSTOLIC BLOOD PRESSURE: 117 MMHG | HEART RATE: 80 BPM

## 2024-12-31 DIAGNOSIS — O36.5990 MATERNAL CARE FOR OTHER KNOWN OR SUSPECTED POOR FETAL GROWTH, UNSPECIFIED TRIMESTER, NOT APPLICABLE OR UNSPECIFIED: ICD-10-CM

## 2024-12-31 DIAGNOSIS — Z98.891 HISTORY OF UTERINE SCAR FROM PREVIOUS SURGERY: Chronic | ICD-10-CM

## 2024-12-31 LAB
ANION GAP SERPL CALC-SCNC: 10 MMOL/L — SIGNIFICANT CHANGE UP (ref 7–14)
BASOPHILS # BLD AUTO: 0.02 K/UL — SIGNIFICANT CHANGE UP (ref 0–0.2)
BASOPHILS NFR BLD AUTO: 0.3 % — SIGNIFICANT CHANGE UP (ref 0–2)
BLD GP AB SCN SERPL QL: NEGATIVE — SIGNIFICANT CHANGE UP
BUN SERPL-MCNC: 6 MG/DL — LOW (ref 7–23)
CALCIUM SERPL-MCNC: 8.7 MG/DL — SIGNIFICANT CHANGE UP (ref 8.4–10.5)
CHLORIDE SERPL-SCNC: 105 MMOL/L — SIGNIFICANT CHANGE UP (ref 98–107)
CK MB BLD-MCNC: 0.7 % — SIGNIFICANT CHANGE UP (ref 0–2.5)
CK MB CFR SERPL CALC: 2.3 NG/ML — SIGNIFICANT CHANGE UP
CK SERPL-CCNC: 322 U/L — HIGH (ref 25–170)
CO2 SERPL-SCNC: 21 MMOL/L — LOW (ref 22–31)
CREAT SERPL-MCNC: 0.39 MG/DL — LOW (ref 0.5–1.3)
EGFR: 137 ML/MIN/1.73M2 — SIGNIFICANT CHANGE UP
EGFR: 137 ML/MIN/1.73M2 — SIGNIFICANT CHANGE UP
EOSINOPHIL # BLD AUTO: 0.06 K/UL — SIGNIFICANT CHANGE UP (ref 0–0.5)
EOSINOPHIL NFR BLD AUTO: 0.8 % — SIGNIFICANT CHANGE UP (ref 0–6)
GLUCOSE SERPL-MCNC: 95 MG/DL — SIGNIFICANT CHANGE UP (ref 70–99)
HCT VFR BLD CALC: 33.2 % — LOW (ref 34.5–45)
HCT VFR BLD CALC: 38.6 % — SIGNIFICANT CHANGE UP (ref 34.5–45)
HGB BLD-MCNC: 11.2 G/DL — LOW (ref 11.5–15.5)
HGB BLD-MCNC: 13.2 G/DL — SIGNIFICANT CHANGE UP (ref 11.5–15.5)
IANC: 4.53 K/UL — SIGNIFICANT CHANGE UP (ref 1.8–7.4)
IMM GRANULOCYTES NFR BLD AUTO: 0.6 % — SIGNIFICANT CHANGE UP (ref 0–0.9)
LACTATE SERPL-SCNC: 1.6 MMOL/L — SIGNIFICANT CHANGE UP (ref 0.5–2)
LYMPHOCYTES # BLD AUTO: 2.61 K/UL — SIGNIFICANT CHANGE UP (ref 1–3.3)
LYMPHOCYTES # BLD AUTO: 33.7 % — SIGNIFICANT CHANGE UP (ref 13–44)
MCHC RBC-ENTMCNC: 28.9 PG — SIGNIFICANT CHANGE UP (ref 27–34)
MCHC RBC-ENTMCNC: 29.1 PG — SIGNIFICANT CHANGE UP (ref 27–34)
MCHC RBC-ENTMCNC: 33.7 G/DL — SIGNIFICANT CHANGE UP (ref 32–36)
MCHC RBC-ENTMCNC: 34.2 G/DL — SIGNIFICANT CHANGE UP (ref 32–36)
MCV RBC AUTO: 85.2 FL — SIGNIFICANT CHANGE UP (ref 80–100)
MCV RBC AUTO: 85.8 FL — SIGNIFICANT CHANGE UP (ref 80–100)
MONOCYTES # BLD AUTO: 0.48 K/UL — SIGNIFICANT CHANGE UP (ref 0–0.9)
MONOCYTES NFR BLD AUTO: 6.2 % — SIGNIFICANT CHANGE UP (ref 2–14)
NEUTROPHILS # BLD AUTO: 4.53 K/UL — SIGNIFICANT CHANGE UP (ref 1.8–7.4)
NEUTROPHILS NFR BLD AUTO: 58.4 % — SIGNIFICANT CHANGE UP (ref 43–77)
NRBC # BLD AUTO: 0 K/UL — SIGNIFICANT CHANGE UP (ref 0–0)
NRBC # BLD AUTO: 0 K/UL — SIGNIFICANT CHANGE UP (ref 0–0)
NRBC # BLD: 0 /100 WBCS — SIGNIFICANT CHANGE UP (ref 0–0)
NRBC # BLD: 0 /100 WBCS — SIGNIFICANT CHANGE UP (ref 0–0)
NRBC # FLD: 0 K/UL — SIGNIFICANT CHANGE UP (ref 0–0)
NRBC # FLD: 0 K/UL — SIGNIFICANT CHANGE UP (ref 0–0)
NRBC BLD-RTO: 0 /100 WBCS — SIGNIFICANT CHANGE UP (ref 0–0)
NRBC BLD-RTO: 0 /100 WBCS — SIGNIFICANT CHANGE UP (ref 0–0)
PLATELET # BLD AUTO: 157 K/UL — SIGNIFICANT CHANGE UP (ref 150–400)
PLATELET # BLD AUTO: 183 K/UL — SIGNIFICANT CHANGE UP (ref 150–400)
POTASSIUM SERPL-MCNC: 4.2 MMOL/L — SIGNIFICANT CHANGE UP (ref 3.5–5.3)
POTASSIUM SERPL-SCNC: 4.2 MMOL/L — SIGNIFICANT CHANGE UP (ref 3.5–5.3)
RBC # BLD: 3.87 M/UL — SIGNIFICANT CHANGE UP (ref 3.8–5.2)
RBC # BLD: 4.53 M/UL — SIGNIFICANT CHANGE UP (ref 3.8–5.2)
RBC # FLD: 13.5 % — SIGNIFICANT CHANGE UP (ref 10.3–14.5)
RBC # FLD: 13.6 % — SIGNIFICANT CHANGE UP (ref 10.3–14.5)
RH IG SCN BLD-IMP: POSITIVE — SIGNIFICANT CHANGE UP
SODIUM SERPL-SCNC: 136 MMOL/L — SIGNIFICANT CHANGE UP (ref 135–145)
TROPONIN T, HIGH SENSITIVITY RESULT: <6 NG/L — SIGNIFICANT CHANGE UP
WBC # BLD: 15.48 K/UL — HIGH (ref 3.8–10.5)
WBC # BLD: 7.75 K/UL — SIGNIFICANT CHANGE UP (ref 3.8–10.5)
WBC # FLD AUTO: 15.48 K/UL — HIGH (ref 3.8–10.5)
WBC # FLD AUTO: 7.75 K/UL — SIGNIFICANT CHANGE UP (ref 3.8–10.5)

## 2024-12-31 PROCEDURE — 93010 ELECTROCARDIOGRAM REPORT: CPT

## 2024-12-31 RX ORDER — OXYCODONE HYDROCHLORIDE 30 MG/1
5 TABLET ORAL ONCE
Refills: 0 | Status: DISCONTINUED | OUTPATIENT
Start: 2024-12-31 | End: 2024-12-31

## 2024-12-31 RX ORDER — DEXAMETHASONE 0.5 MG/1
4 TABLET ORAL EVERY 6 HOURS
Refills: 0 | Status: DISCONTINUED | OUTPATIENT
Start: 2024-12-31 | End: 2025-01-02

## 2024-12-31 RX ORDER — MODIFIED LANOLIN 100 %
1 CREAM (GRAM) TOPICAL EVERY 6 HOURS
Refills: 0 | Status: DISCONTINUED | OUTPATIENT
Start: 2024-12-31 | End: 2025-01-04

## 2024-12-31 RX ORDER — SODIUM CHLORIDE 9 G/1000ML
500 INJECTION, SOLUTION INTRAVENOUS ONCE
Refills: 0 | Status: DISCONTINUED | OUTPATIENT
Start: 2024-12-31 | End: 2025-01-04

## 2024-12-31 RX ORDER — HEPARIN SODIUM 1000 [USP'U]/ML
5000 INJECTION INTRAVENOUS; SUBCUTANEOUS EVERY 12 HOURS
Refills: 0 | Status: DISCONTINUED | OUTPATIENT
Start: 2024-12-31 | End: 2025-01-04

## 2024-12-31 RX ORDER — CITRIC ACID/SODIUM CITRATE 300-500 MG
30 SOLUTION, ORAL ORAL ONCE
Refills: 0 | Status: COMPLETED | OUTPATIENT
Start: 2024-12-31 | End: 2024-12-31

## 2024-12-31 RX ORDER — DIPHENHYDRAMINE HCL 12.5MG/5ML
25 ELIXIR ORAL EVERY 4 HOURS
Refills: 0 | Status: DISCONTINUED | OUTPATIENT
Start: 2024-12-31 | End: 2025-01-02

## 2024-12-31 RX ORDER — IBUPROFEN 200 MG
600 TABLET ORAL EVERY 6 HOURS
Refills: 0 | Status: COMPLETED | OUTPATIENT
Start: 2024-12-31 | End: 2025-11-29

## 2024-12-31 RX ORDER — OXYCODONE HYDROCHLORIDE 30 MG/1
10 TABLET ORAL ONCE
Refills: 0 | Status: DISCONTINUED | OUTPATIENT
Start: 2024-12-31 | End: 2024-12-31

## 2024-12-31 RX ORDER — ACETAMINOPHEN 500 MG/5ML
975 LIQUID (ML) ORAL
Refills: 0 | Status: DISCONTINUED | OUTPATIENT
Start: 2024-12-31 | End: 2025-01-04

## 2024-12-31 RX ORDER — KETOROLAC TROMETHAMINE 30 MG/ML
30 INJECTION, SOLUTION INTRAMUSCULAR; INTRAVENOUS EVERY 6 HOURS
Refills: 0 | Status: DISCONTINUED | OUTPATIENT
Start: 2024-12-31 | End: 2025-01-01

## 2024-12-31 RX ORDER — DIPHENHYDRAMINE HCL 12.5MG/5ML
25 ELIXIR ORAL EVERY 6 HOURS
Refills: 0 | Status: DISCONTINUED | OUTPATIENT
Start: 2024-12-31 | End: 2025-01-04

## 2024-12-31 RX ORDER — OXYCODONE HYDROCHLORIDE 30 MG/1
5 TABLET ORAL ONCE
Refills: 0 | Status: DISCONTINUED | OUTPATIENT
Start: 2024-12-31 | End: 2025-01-04

## 2024-12-31 RX ORDER — SODIUM CHLORIDE 9 G/1000ML
1000 INJECTION, SOLUTION INTRAVENOUS
Refills: 0 | Status: DISCONTINUED | OUTPATIENT
Start: 2024-12-31 | End: 2025-01-03

## 2024-12-31 RX ORDER — ONDANSETRON HCL/PF 4 MG/2 ML
4 VIAL (ML) INJECTION ONCE
Refills: 0 | Status: DISCONTINUED | OUTPATIENT
Start: 2024-12-31 | End: 2024-12-31

## 2024-12-31 RX ORDER — SIMETHICONE 80 MG
80 TABLET,CHEWABLE ORAL EVERY 4 HOURS
Refills: 0 | Status: DISCONTINUED | OUTPATIENT
Start: 2024-12-31 | End: 2025-01-04

## 2024-12-31 RX ORDER — MAGNESIUM HYDROXIDE 400 MG/5ML
30 SUSPENSION ORAL
Refills: 0 | Status: DISCONTINUED | OUTPATIENT
Start: 2024-12-31 | End: 2025-01-04

## 2024-12-31 RX ORDER — NALBUPHINE HYDROCHLORIDE 10 MG/ML
2.5 INJECTION INTRAMUSCULAR; INTRAVENOUS; SUBCUTANEOUS EVERY 6 HOURS
Refills: 0 | Status: DISCONTINUED | OUTPATIENT
Start: 2024-12-31 | End: 2025-01-02

## 2024-12-31 RX ORDER — OXYTOCIN-SODIUM CHLORIDE 0.9% IV SOLN 30 UNIT/500ML 30-0.9/5 UT/ML-%
42 SOLUTION INTRAVENOUS
Qty: 30 | Refills: 0 | Status: DISCONTINUED | OUTPATIENT
Start: 2024-12-31 | End: 2025-01-02

## 2024-12-31 RX ORDER — ONDANSETRON HCL/PF 4 MG/2 ML
4 VIAL (ML) INJECTION EVERY 6 HOURS
Refills: 0 | Status: DISCONTINUED | OUTPATIENT
Start: 2024-12-31 | End: 2025-01-02

## 2024-12-31 RX ORDER — OXYCODONE HYDROCHLORIDE 30 MG/1
5 TABLET ORAL
Refills: 0 | Status: COMPLETED | OUTPATIENT
Start: 2024-12-31 | End: 2025-01-07

## 2024-12-31 RX ORDER — NALOXONE HYDROCHLORIDE 0.4 MG/ML
0.1 INJECTION, SOLUTION INTRAMUSCULAR; INTRAVENOUS; SUBCUTANEOUS
Refills: 0 | Status: DISCONTINUED | OUTPATIENT
Start: 2024-12-31 | End: 2025-01-02

## 2024-12-31 RX ADMIN — Medication 20 MILLIGRAM(S): at 11:39

## 2024-12-31 RX ADMIN — SODIUM CHLORIDE 200 MILLILITER(S): 9 INJECTION, SOLUTION INTRAVENOUS at 10:40

## 2024-12-31 RX ADMIN — KETOROLAC TROMETHAMINE 30 MILLIGRAM(S): 30 INJECTION, SOLUTION INTRAMUSCULAR; INTRAVENOUS at 20:05

## 2024-12-31 RX ADMIN — Medication 1 APPLICATION(S): at 10:41

## 2024-12-31 RX ADMIN — Medication 975 MILLIGRAM(S): at 21:30

## 2024-12-31 RX ADMIN — HEPARIN SODIUM 5000 UNIT(S): 1000 INJECTION INTRAVENOUS; SUBCUTANEOUS at 21:30

## 2024-12-31 RX ADMIN — Medication 30 MILLILITER(S): at 10:40

## 2024-12-31 RX ADMIN — Medication 975 MILLIGRAM(S): at 22:30

## 2025-01-01 ENCOUNTER — RESULT REVIEW (OUTPATIENT)
Age: 30
End: 2025-01-01

## 2025-01-01 LAB
BASOPHILS # BLD AUTO: 0.01 K/UL — SIGNIFICANT CHANGE UP (ref 0–0.2)
BASOPHILS NFR BLD AUTO: 0.1 % — SIGNIFICANT CHANGE UP (ref 0–2)
CK MB BLD-MCNC: 0.6 % — SIGNIFICANT CHANGE UP (ref 0–2.5)
CK MB CFR SERPL CALC: 2.3 NG/ML — SIGNIFICANT CHANGE UP
CK SERPL-CCNC: 396 U/L — HIGH (ref 25–170)
EOSINOPHIL # BLD AUTO: 0 K/UL — SIGNIFICANT CHANGE UP (ref 0–0.5)
EOSINOPHIL NFR BLD AUTO: 0 % — SIGNIFICANT CHANGE UP (ref 0–6)
HCT VFR BLD CALC: 27.2 % — LOW (ref 34.5–45)
HGB BLD-MCNC: 9.5 G/DL — LOW (ref 11.5–15.5)
IANC: 11.88 K/UL — HIGH (ref 1.8–7.4)
IMM GRANULOCYTES NFR BLD AUTO: 0.4 % — SIGNIFICANT CHANGE UP (ref 0–0.9)
LYMPHOCYTES # BLD AUTO: 1.63 K/UL — SIGNIFICANT CHANGE UP (ref 1–3.3)
LYMPHOCYTES # BLD AUTO: 11.5 % — LOW (ref 13–44)
MCHC RBC-ENTMCNC: 29.5 PG — SIGNIFICANT CHANGE UP (ref 27–34)
MCHC RBC-ENTMCNC: 34.9 G/DL — SIGNIFICANT CHANGE UP (ref 32–36)
MCV RBC AUTO: 84.5 FL — SIGNIFICANT CHANGE UP (ref 80–100)
MONOCYTES # BLD AUTO: 0.65 K/UL — SIGNIFICANT CHANGE UP (ref 0–0.9)
MONOCYTES NFR BLD AUTO: 4.6 % — SIGNIFICANT CHANGE UP (ref 2–14)
NEUTROPHILS # BLD AUTO: 11.88 K/UL — HIGH (ref 1.8–7.4)
NEUTROPHILS NFR BLD AUTO: 83.4 % — HIGH (ref 43–77)
NRBC # BLD AUTO: 0 K/UL — SIGNIFICANT CHANGE UP (ref 0–0)
NRBC # BLD: 0 /100 WBCS — SIGNIFICANT CHANGE UP (ref 0–0)
NRBC # FLD: 0 K/UL — SIGNIFICANT CHANGE UP (ref 0–0)
NRBC BLD-RTO: 0 /100 WBCS — SIGNIFICANT CHANGE UP (ref 0–0)
PLATELET # BLD AUTO: 164 K/UL — SIGNIFICANT CHANGE UP (ref 150–400)
RBC # BLD: 3.22 M/UL — LOW (ref 3.8–5.2)
RBC # FLD: 13.2 % — SIGNIFICANT CHANGE UP (ref 10.3–14.5)
T PALLIDUM AB TITR SER: NEGATIVE — SIGNIFICANT CHANGE UP
TROPONIN T, HIGH SENSITIVITY RESULT: 6 NG/L — SIGNIFICANT CHANGE UP
WBC # BLD: 14.22 K/UL — HIGH (ref 3.8–10.5)
WBC # FLD AUTO: 14.22 K/UL — HIGH (ref 3.8–10.5)

## 2025-01-01 PROCEDURE — 93306 TTE W/DOPPLER COMPLETE: CPT | Mod: 26

## 2025-01-01 RX ORDER — OXYCODONE HYDROCHLORIDE 30 MG/1
5 TABLET ORAL ONCE
Refills: 0 | Status: DISCONTINUED | OUTPATIENT
Start: 2025-01-01 | End: 2025-01-04

## 2025-01-01 RX ORDER — OXYCODONE HYDROCHLORIDE 30 MG/1
5 TABLET ORAL
Refills: 0 | Status: DISCONTINUED | OUTPATIENT
Start: 2025-01-01 | End: 2025-01-04

## 2025-01-01 RX ORDER — IBUPROFEN 200 MG
600 TABLET ORAL EVERY 6 HOURS
Refills: 0 | Status: DISCONTINUED | OUTPATIENT
Start: 2025-01-01 | End: 2025-01-04

## 2025-01-01 RX ORDER — IBUPROFEN 200 MG
1 TABLET ORAL
Qty: 0 | Refills: 0 | DISCHARGE
Start: 2025-01-01

## 2025-01-01 RX ORDER — ACETAMINOPHEN 500 MG/5ML
3 LIQUID (ML) ORAL
Qty: 0 | Refills: 0 | DISCHARGE
Start: 2025-01-01

## 2025-01-01 RX ADMIN — KETOROLAC TROMETHAMINE 30 MILLIGRAM(S): 30 INJECTION, SOLUTION INTRAMUSCULAR; INTRAVENOUS at 06:25

## 2025-01-01 RX ADMIN — KETOROLAC TROMETHAMINE 30 MILLIGRAM(S): 30 INJECTION, SOLUTION INTRAMUSCULAR; INTRAVENOUS at 06:00

## 2025-01-01 RX ADMIN — KETOROLAC TROMETHAMINE 30 MILLIGRAM(S): 30 INJECTION, SOLUTION INTRAMUSCULAR; INTRAVENOUS at 00:32

## 2025-01-01 RX ADMIN — Medication 975 MILLIGRAM(S): at 07:53

## 2025-01-01 RX ADMIN — Medication 975 MILLIGRAM(S): at 08:25

## 2025-01-01 RX ADMIN — KETOROLAC TROMETHAMINE 30 MILLIGRAM(S): 30 INJECTION, SOLUTION INTRAMUSCULAR; INTRAVENOUS at 11:37

## 2025-01-01 RX ADMIN — Medication 975 MILLIGRAM(S): at 15:40

## 2025-01-01 RX ADMIN — Medication 600 MILLIGRAM(S): at 18:13

## 2025-01-01 RX ADMIN — Medication 600 MILLIGRAM(S): at 18:43

## 2025-01-01 RX ADMIN — HEPARIN SODIUM 5000 UNIT(S): 1000 INJECTION INTRAVENOUS; SUBCUTANEOUS at 09:34

## 2025-01-01 RX ADMIN — KETOROLAC TROMETHAMINE 30 MILLIGRAM(S): 30 INJECTION, SOLUTION INTRAMUSCULAR; INTRAVENOUS at 01:00

## 2025-01-01 RX ADMIN — Medication 975 MILLIGRAM(S): at 20:42

## 2025-01-01 RX ADMIN — KETOROLAC TROMETHAMINE 30 MILLIGRAM(S): 30 INJECTION, SOLUTION INTRAMUSCULAR; INTRAVENOUS at 12:20

## 2025-01-01 RX ADMIN — HEPARIN SODIUM 5000 UNIT(S): 1000 INJECTION INTRAVENOUS; SUBCUTANEOUS at 21:47

## 2025-01-01 RX ADMIN — Medication 975 MILLIGRAM(S): at 21:15

## 2025-01-01 RX ADMIN — Medication 975 MILLIGRAM(S): at 15:10

## 2025-01-02 LAB
ALBUMIN SERPL ELPH-MCNC: 2.8 G/DL — LOW (ref 3.3–5)
ALP SERPL-CCNC: 165 U/L — HIGH (ref 40–120)
ALT FLD-CCNC: 14 U/L — SIGNIFICANT CHANGE UP (ref 4–33)
ANION GAP SERPL CALC-SCNC: 8 MMOL/L — SIGNIFICANT CHANGE UP (ref 7–14)
AST SERPL-CCNC: 30 U/L — SIGNIFICANT CHANGE UP (ref 4–32)
BASOPHILS # BLD AUTO: 0.01 K/UL — SIGNIFICANT CHANGE UP (ref 0–0.2)
BASOPHILS # BLD AUTO: 0.02 K/UL — SIGNIFICANT CHANGE UP (ref 0–0.2)
BASOPHILS NFR BLD AUTO: 0.1 % — SIGNIFICANT CHANGE UP (ref 0–2)
BASOPHILS NFR BLD AUTO: 0.2 % — SIGNIFICANT CHANGE UP (ref 0–2)
BILIRUB SERPL-MCNC: <0.2 MG/DL — SIGNIFICANT CHANGE UP (ref 0.2–1.2)
BUN SERPL-MCNC: 8 MG/DL — SIGNIFICANT CHANGE UP (ref 7–23)
CALCIUM SERPL-MCNC: 8.4 MG/DL — SIGNIFICANT CHANGE UP (ref 8.4–10.5)
CHLORIDE SERPL-SCNC: 109 MMOL/L — HIGH (ref 98–107)
CO2 SERPL-SCNC: 22 MMOL/L — SIGNIFICANT CHANGE UP (ref 22–31)
CREAT SERPL-MCNC: 0.46 MG/DL — LOW (ref 0.5–1.3)
EGFR: 132 ML/MIN/1.73M2 — SIGNIFICANT CHANGE UP
EGFR: 132 ML/MIN/1.73M2 — SIGNIFICANT CHANGE UP
EOSINOPHIL # BLD AUTO: 0.03 K/UL — SIGNIFICANT CHANGE UP (ref 0–0.5)
EOSINOPHIL # BLD AUTO: 0.04 K/UL — SIGNIFICANT CHANGE UP (ref 0–0.5)
EOSINOPHIL NFR BLD AUTO: 0.3 % — SIGNIFICANT CHANGE UP (ref 0–6)
EOSINOPHIL NFR BLD AUTO: 0.4 % — SIGNIFICANT CHANGE UP (ref 0–6)
GLUCOSE SERPL-MCNC: 99 MG/DL — SIGNIFICANT CHANGE UP (ref 70–99)
HCT VFR BLD CALC: 23.2 % — LOW (ref 34.5–45)
HCT VFR BLD CALC: 23.4 % — LOW (ref 34.5–45)
HGB BLD-MCNC: 7.7 G/DL — LOW (ref 11.5–15.5)
HGB BLD-MCNC: 7.9 G/DL — LOW (ref 11.5–15.5)
IANC: 7.25 K/UL — SIGNIFICANT CHANGE UP (ref 1.8–7.4)
IANC: 7.89 K/UL — HIGH (ref 1.8–7.4)
IMM GRANULOCYTES NFR BLD AUTO: 1 % — HIGH (ref 0–0.9)
IMM GRANULOCYTES NFR BLD AUTO: 1.4 % — HIGH (ref 0–0.9)
LDH SERPL L TO P-CCNC: 183 U/L — SIGNIFICANT CHANGE UP (ref 135–225)
LYMPHOCYTES # BLD AUTO: 2.15 K/UL — SIGNIFICANT CHANGE UP (ref 1–3.3)
LYMPHOCYTES # BLD AUTO: 2.35 K/UL — SIGNIFICANT CHANGE UP (ref 1–3.3)
LYMPHOCYTES # BLD AUTO: 20 % — SIGNIFICANT CHANGE UP (ref 13–44)
LYMPHOCYTES # BLD AUTO: 22.7 % — SIGNIFICANT CHANGE UP (ref 13–44)
MCHC RBC-ENTMCNC: 28.8 PG — SIGNIFICANT CHANGE UP (ref 27–34)
MCHC RBC-ENTMCNC: 29.3 PG — SIGNIFICANT CHANGE UP (ref 27–34)
MCHC RBC-ENTMCNC: 33.2 G/DL — SIGNIFICANT CHANGE UP (ref 32–36)
MCHC RBC-ENTMCNC: 33.8 G/DL — SIGNIFICANT CHANGE UP (ref 32–36)
MCV RBC AUTO: 86.7 FL — SIGNIFICANT CHANGE UP (ref 80–100)
MCV RBC AUTO: 86.9 FL — SIGNIFICANT CHANGE UP (ref 80–100)
MONOCYTES # BLD AUTO: 0.54 K/UL — SIGNIFICANT CHANGE UP (ref 0–0.9)
MONOCYTES # BLD AUTO: 0.55 K/UL — SIGNIFICANT CHANGE UP (ref 0–0.9)
MONOCYTES NFR BLD AUTO: 5.1 % — SIGNIFICANT CHANGE UP (ref 2–14)
MONOCYTES NFR BLD AUTO: 5.2 % — SIGNIFICANT CHANGE UP (ref 2–14)
NEUTROPHILS # BLD AUTO: 7.25 K/UL — SIGNIFICANT CHANGE UP (ref 1.8–7.4)
NEUTROPHILS # BLD AUTO: 7.89 K/UL — HIGH (ref 1.8–7.4)
NEUTROPHILS NFR BLD AUTO: 70.1 % — SIGNIFICANT CHANGE UP (ref 43–77)
NEUTROPHILS NFR BLD AUTO: 73.5 % — SIGNIFICANT CHANGE UP (ref 43–77)
NRBC # BLD AUTO: 0 K/UL — SIGNIFICANT CHANGE UP (ref 0–0)
NRBC # BLD AUTO: 0 K/UL — SIGNIFICANT CHANGE UP (ref 0–0)
NRBC # BLD: 0 /100 WBCS — SIGNIFICANT CHANGE UP (ref 0–0)
NRBC # BLD: 0 /100 WBCS — SIGNIFICANT CHANGE UP (ref 0–0)
NRBC # FLD: 0 K/UL — SIGNIFICANT CHANGE UP (ref 0–0)
NRBC # FLD: 0 K/UL — SIGNIFICANT CHANGE UP (ref 0–0)
NRBC BLD-RTO: 0 /100 WBCS — SIGNIFICANT CHANGE UP (ref 0–0)
NRBC BLD-RTO: 0 /100 WBCS — SIGNIFICANT CHANGE UP (ref 0–0)
PLATELET # BLD AUTO: 157 K/UL — SIGNIFICANT CHANGE UP (ref 150–400)
PLATELET # BLD AUTO: 168 K/UL — SIGNIFICANT CHANGE UP (ref 150–400)
POTASSIUM SERPL-MCNC: 3.6 MMOL/L — SIGNIFICANT CHANGE UP (ref 3.5–5.3)
POTASSIUM SERPL-SCNC: 3.6 MMOL/L — SIGNIFICANT CHANGE UP (ref 3.5–5.3)
PROT SERPL-MCNC: 5.2 G/DL — LOW (ref 6–8.3)
RBC # BLD: 2.67 M/UL — LOW (ref 3.8–5.2)
RBC # BLD: 2.7 M/UL — LOW (ref 3.8–5.2)
RBC # FLD: 13.7 % — SIGNIFICANT CHANGE UP (ref 10.3–14.5)
RBC # FLD: 13.8 % — SIGNIFICANT CHANGE UP (ref 10.3–14.5)
SODIUM SERPL-SCNC: 139 MMOL/L — SIGNIFICANT CHANGE UP (ref 135–145)
URATE SERPL-MCNC: 4.7 MG/DL — SIGNIFICANT CHANGE UP (ref 2.5–7)
WBC # BLD: 10.34 K/UL — SIGNIFICANT CHANGE UP (ref 3.8–10.5)
WBC # BLD: 10.74 K/UL — HIGH (ref 3.8–10.5)
WBC # FLD AUTO: 10.34 K/UL — SIGNIFICANT CHANGE UP (ref 3.8–10.5)
WBC # FLD AUTO: 10.74 K/UL — HIGH (ref 3.8–10.5)

## 2025-01-02 RX ORDER — FERROUS SULFATE 137(45) MG
325 TABLET, EXTENDED RELEASE ORAL
Refills: 0 | Status: DISCONTINUED | OUTPATIENT
Start: 2025-01-02 | End: 2025-01-04

## 2025-01-02 RX ORDER — IRON SUCROSE 20 MG/ML
200 INJECTION, SOLUTION INTRAVENOUS ONCE
Refills: 0 | Status: COMPLETED | OUTPATIENT
Start: 2025-01-02 | End: 2025-01-02

## 2025-01-02 RX ORDER — FERROUS SULFATE 137(45) MG
1 TABLET, EXTENDED RELEASE ORAL
Qty: 0 | Refills: 0 | DISCHARGE
Start: 2025-01-02

## 2025-01-02 RX ADMIN — Medication 975 MILLIGRAM(S): at 21:20

## 2025-01-02 RX ADMIN — Medication 975 MILLIGRAM(S): at 15:15

## 2025-01-02 RX ADMIN — IRON SUCROSE 110 MILLIGRAM(S): 20 INJECTION, SOLUTION INTRAVENOUS at 12:18

## 2025-01-02 RX ADMIN — Medication 600 MILLIGRAM(S): at 06:40

## 2025-01-02 RX ADMIN — Medication 500 MILLIGRAM(S): at 12:16

## 2025-01-02 RX ADMIN — Medication 975 MILLIGRAM(S): at 08:22

## 2025-01-02 RX ADMIN — Medication 975 MILLIGRAM(S): at 16:00

## 2025-01-02 RX ADMIN — Medication 600 MILLIGRAM(S): at 06:09

## 2025-01-02 RX ADMIN — Medication 600 MILLIGRAM(S): at 00:23

## 2025-01-02 RX ADMIN — Medication 600 MILLIGRAM(S): at 12:16

## 2025-01-02 RX ADMIN — Medication 325 MILLIGRAM(S): at 12:41

## 2025-01-02 RX ADMIN — Medication 975 MILLIGRAM(S): at 20:49

## 2025-01-02 RX ADMIN — HEPARIN SODIUM 5000 UNIT(S): 1000 INJECTION INTRAVENOUS; SUBCUTANEOUS at 20:49

## 2025-01-02 RX ADMIN — Medication 600 MILLIGRAM(S): at 13:00

## 2025-01-02 RX ADMIN — Medication 600 MILLIGRAM(S): at 18:07

## 2025-01-02 RX ADMIN — Medication 975 MILLIGRAM(S): at 09:20

## 2025-01-02 RX ADMIN — Medication 600 MILLIGRAM(S): at 01:00

## 2025-01-02 RX ADMIN — HEPARIN SODIUM 5000 UNIT(S): 1000 INJECTION INTRAVENOUS; SUBCUTANEOUS at 08:25

## 2025-01-03 DIAGNOSIS — D62 ACUTE POSTHEMORRHAGIC ANEMIA: ICD-10-CM

## 2025-01-03 RX ADMIN — Medication 600 MILLIGRAM(S): at 06:06

## 2025-01-03 RX ADMIN — Medication 975 MILLIGRAM(S): at 16:45

## 2025-01-03 RX ADMIN — HEPARIN SODIUM 5000 UNIT(S): 1000 INJECTION INTRAVENOUS; SUBCUTANEOUS at 20:41

## 2025-01-03 RX ADMIN — Medication 975 MILLIGRAM(S): at 21:17

## 2025-01-03 RX ADMIN — Medication 600 MILLIGRAM(S): at 12:42

## 2025-01-03 RX ADMIN — Medication 600 MILLIGRAM(S): at 12:01

## 2025-01-03 RX ADMIN — Medication 600 MILLIGRAM(S): at 18:30

## 2025-01-03 RX ADMIN — Medication 600 MILLIGRAM(S): at 17:31

## 2025-01-03 RX ADMIN — Medication 600 MILLIGRAM(S): at 00:35

## 2025-01-03 RX ADMIN — Medication 600 MILLIGRAM(S): at 06:40

## 2025-01-03 RX ADMIN — Medication 500 MILLIGRAM(S): at 15:58

## 2025-01-03 RX ADMIN — Medication 975 MILLIGRAM(S): at 20:41

## 2025-01-03 RX ADMIN — Medication 325 MILLIGRAM(S): at 00:03

## 2025-01-03 RX ADMIN — Medication 600 MILLIGRAM(S): at 00:03

## 2025-01-03 RX ADMIN — Medication 325 MILLIGRAM(S): at 17:31

## 2025-01-03 RX ADMIN — Medication 975 MILLIGRAM(S): at 15:58

## 2025-01-04 VITALS
OXYGEN SATURATION: 99 % | RESPIRATION RATE: 18 BRPM | TEMPERATURE: 98 F | HEART RATE: 78 BPM | DIASTOLIC BLOOD PRESSURE: 78 MMHG | SYSTOLIC BLOOD PRESSURE: 130 MMHG

## 2025-01-04 RX ADMIN — Medication 500 MILLIGRAM(S): at 14:49

## 2025-01-04 RX ADMIN — Medication 600 MILLIGRAM(S): at 15:40

## 2025-01-04 RX ADMIN — Medication 600 MILLIGRAM(S): at 14:49

## 2025-01-04 RX ADMIN — Medication 975 MILLIGRAM(S): at 08:36

## 2025-01-04 RX ADMIN — Medication 975 MILLIGRAM(S): at 09:30

## 2025-01-04 RX ADMIN — Medication 600 MILLIGRAM(S): at 00:41

## 2025-01-04 RX ADMIN — Medication 975 MILLIGRAM(S): at 03:26

## 2025-01-04 RX ADMIN — Medication 975 MILLIGRAM(S): at 02:38

## 2025-01-04 RX ADMIN — Medication 600 MILLIGRAM(S): at 00:04

## 2025-04-23 NOTE — PROVIDER CONTACT NOTE (OTHER) - NAME OF MD/NP/PA/DO NOTIFIED:
Hx of DM in 2020 s/p gastric sleeve; A1C 5.7% in August - will repeat. Labs below.     Orders:    CBC with Auto Differential; Future    Comprehensive Metabolic Panel; Future    Hemoglobin A1C; Future    Lipid Panel; Future    TSH reflex to FT4; Future    
Tigist Dunn
SESAR Callejas